# Patient Record
Sex: FEMALE | Race: ASIAN | Employment: OTHER | URBAN - METROPOLITAN AREA
[De-identification: names, ages, dates, MRNs, and addresses within clinical notes are randomized per-mention and may not be internally consistent; named-entity substitution may affect disease eponyms.]

---

## 2023-03-23 ENCOUNTER — HOSPITAL ENCOUNTER (OUTPATIENT)
Age: 68
Discharge: HOME | End: 2023-03-23

## 2023-03-23 LAB
EKG ATRIAL RATE: 81 BPM
EKG P AXIS: 58 DEGREES
EKG P-R INTERVAL: 174 MS
EKG Q-T INTERVAL: 420 MS
EKG QRS DURATION: 100 MS
EKG QTC CALCULATION (BAZETT): 487 MS
EKG R AXIS: 51 DEGREES
EKG T AXIS: -171 DEGREES
EKG VENTRICULAR RATE: 81 BPM

## 2023-03-23 PROCEDURE — 93005 ELECTROCARDIOGRAM TRACING: CPT | Performed by: NURSE PRACTITIONER

## 2023-03-24 ENCOUNTER — APPOINTMENT (OUTPATIENT)
Dept: GENERAL RADIOLOGY | Age: 68
End: 2023-03-24
Payer: MEDICAID

## 2023-03-24 ENCOUNTER — HOSPITAL ENCOUNTER (INPATIENT)
Age: 68
LOS: 15 days | Discharge: HOME | End: 2023-04-08
Attending: EMERGENCY MEDICINE | Admitting: THORACIC SURGERY (CARDIOTHORACIC VASCULAR SURGERY)
Payer: MEDICAID

## 2023-03-24 DIAGNOSIS — I35.0 AORTIC STENOSIS, SEVERE: ICD-10-CM

## 2023-03-24 DIAGNOSIS — Z95.2 S/P AVR: ICD-10-CM

## 2023-03-24 DIAGNOSIS — I21.4 NSTEMI (NON-ST ELEVATED MYOCARDIAL INFARCTION) (HCC): Primary | ICD-10-CM

## 2023-03-24 LAB
ABSOLUTE EOS #: 0.11 K/UL (ref 0–0.44)
ABSOLUTE IMMATURE GRANULOCYTE: <0.03 K/UL (ref 0–0.3)
ABSOLUTE LYMPH #: 3.02 K/UL (ref 1.1–3.7)
ABSOLUTE MONO #: 0.48 K/UL (ref 0.1–1.2)
ANION GAP SERPL CALCULATED.3IONS-SCNC: 10 MMOL/L (ref 9–17)
BASOPHILS # BLD: 0 % (ref 0–2)
BASOPHILS ABSOLUTE: <0.03 K/UL (ref 0–0.2)
BNP SERPL-MCNC: 3234 PG/ML
BUN SERPL-MCNC: 29 MG/DL (ref 8–23)
CALCIUM SERPL-MCNC: 9.2 MG/DL (ref 8.6–10.4)
CHLORIDE SERPL-SCNC: 104 MMOL/L (ref 98–107)
CO2 SERPL-SCNC: 23 MMOL/L (ref 20–31)
CREAT SERPL-MCNC: 0.93 MG/DL (ref 0.5–0.9)
EOSINOPHILS RELATIVE PERCENT: 2 % (ref 1–4)
GFR SERPL CREATININE-BSD FRML MDRD: >60 ML/MIN/1.73M2
GLUCOSE SERPL-MCNC: 103 MG/DL (ref 70–99)
HCT VFR BLD AUTO: 42.6 % (ref 36.3–47.1)
HGB BLD-MCNC: 13.5 G/DL (ref 11.9–15.1)
IMMATURE GRANULOCYTES: 0 %
LYMPHOCYTES # BLD: 55 % (ref 24–43)
MCH RBC QN AUTO: 29.1 PG (ref 25.2–33.5)
MCHC RBC AUTO-ENTMCNC: 31.7 G/DL (ref 28.4–34.8)
MCV RBC AUTO: 91.8 FL (ref 82.6–102.9)
MONOCYTES # BLD: 9 % (ref 3–12)
NRBC AUTOMATED: 0 PER 100 WBC
PARTIAL THROMBOPLASTIN TIME: 32.5 SEC (ref 23–36.5)
PDW BLD-RTO: 13.8 % (ref 11.8–14.4)
PLATELET # BLD AUTO: 197 K/UL (ref 138–453)
PMV BLD AUTO: 10.1 FL (ref 8.1–13.5)
POTASSIUM SERPL-SCNC: 4.2 MMOL/L (ref 3.7–5.3)
RBC # BLD: 4.64 M/UL (ref 3.95–5.11)
SEG NEUTROPHILS: 34 % (ref 36–65)
SEGMENTED NEUTROPHILS ABSOLUTE COUNT: 1.91 K/UL (ref 1.5–8.1)
SODIUM SERPL-SCNC: 137 MMOL/L (ref 135–144)
TROPONIN I SERPL DL<=0.01 NG/ML-MCNC: 20 NG/L (ref 0–14)
TROPONIN I SERPL DL<=0.01 NG/ML-MCNC: 22 NG/L (ref 0–14)
TROPONIN I SERPL DL<=0.01 NG/ML-MCNC: 23 NG/L (ref 0–14)
WBC # BLD AUTO: 5.5 K/UL (ref 3.5–11.3)

## 2023-03-24 PROCEDURE — 36415 COLL VENOUS BLD VENIPUNCTURE: CPT

## 2023-03-24 PROCEDURE — 99285 EMERGENCY DEPT VISIT HI MDM: CPT

## 2023-03-24 PROCEDURE — 6360000002 HC RX W HCPCS

## 2023-03-24 PROCEDURE — 83880 ASSAY OF NATRIURETIC PEPTIDE: CPT

## 2023-03-24 PROCEDURE — 80048 BASIC METABOLIC PNL TOTAL CA: CPT

## 2023-03-24 PROCEDURE — 71046 X-RAY EXAM CHEST 2 VIEWS: CPT

## 2023-03-24 PROCEDURE — 93005 ELECTROCARDIOGRAM TRACING: CPT

## 2023-03-24 PROCEDURE — 2060000000 HC ICU INTERMEDIATE R&B

## 2023-03-24 PROCEDURE — 6370000000 HC RX 637 (ALT 250 FOR IP)

## 2023-03-24 PROCEDURE — 85025 COMPLETE CBC W/AUTO DIFF WBC: CPT

## 2023-03-24 PROCEDURE — 85730 THROMBOPLASTIN TIME PARTIAL: CPT

## 2023-03-24 PROCEDURE — 84484 ASSAY OF TROPONIN QUANT: CPT

## 2023-03-24 RX ORDER — POLYETHYLENE GLYCOL 3350 17 G/17G
17 POWDER, FOR SOLUTION ORAL DAILY PRN
Status: DISCONTINUED | OUTPATIENT
Start: 2023-03-24 | End: 2023-04-04

## 2023-03-24 RX ORDER — ONDANSETRON 4 MG/1
4 TABLET, ORALLY DISINTEGRATING ORAL EVERY 8 HOURS PRN
Status: DISCONTINUED | OUTPATIENT
Start: 2023-03-24 | End: 2023-04-04

## 2023-03-24 RX ORDER — HEPARIN SODIUM 1000 [USP'U]/ML
30 INJECTION, SOLUTION INTRAVENOUS; SUBCUTANEOUS PRN
Status: DISCONTINUED | OUTPATIENT
Start: 2023-03-24 | End: 2023-03-28

## 2023-03-24 RX ORDER — ATORVASTATIN CALCIUM 40 MG/1
40 TABLET, FILM COATED ORAL DAILY
Status: DISCONTINUED | OUTPATIENT
Start: 2023-03-25 | End: 2023-04-04

## 2023-03-24 RX ORDER — SPIRONOLACTONE 25 MG/1
12.5 TABLET ORAL DAILY
COMMUNITY

## 2023-03-24 RX ORDER — HEPARIN SODIUM 1000 [USP'U]/ML
60 INJECTION, SOLUTION INTRAVENOUS; SUBCUTANEOUS ONCE
Status: COMPLETED | OUTPATIENT
Start: 2023-03-24 | End: 2023-03-24

## 2023-03-24 RX ORDER — ASPIRIN 81 MG/1
324 TABLET, CHEWABLE ORAL ONCE
Status: COMPLETED | OUTPATIENT
Start: 2023-03-24 | End: 2023-03-24

## 2023-03-24 RX ORDER — ASPIRIN 81 MG/1
81 TABLET, CHEWABLE ORAL DAILY
COMMUNITY

## 2023-03-24 RX ORDER — HEPARIN SODIUM AND DEXTROSE 10000; 5 [USP'U]/100ML; G/100ML
5-30 INJECTION INTRAVENOUS CONTINUOUS
Status: DISCONTINUED | OUTPATIENT
Start: 2023-03-24 | End: 2023-03-28

## 2023-03-24 RX ORDER — FUROSEMIDE 10 MG/ML
20 INJECTION INTRAMUSCULAR; INTRAVENOUS ONCE
Status: COMPLETED | OUTPATIENT
Start: 2023-03-25 | End: 2023-03-25

## 2023-03-24 RX ORDER — SODIUM CHLORIDE 0.9 % (FLUSH) 0.9 %
5-40 SYRINGE (ML) INJECTION PRN
Status: DISCONTINUED | OUTPATIENT
Start: 2023-03-24 | End: 2023-03-28

## 2023-03-24 RX ORDER — SODIUM CHLORIDE 0.9 % (FLUSH) 0.9 %
5-40 SYRINGE (ML) INJECTION EVERY 12 HOURS SCHEDULED
Status: DISCONTINUED | OUTPATIENT
Start: 2023-03-24 | End: 2023-03-28

## 2023-03-24 RX ORDER — ASPIRIN 81 MG/1
81 TABLET, CHEWABLE ORAL DAILY
Status: DISCONTINUED | OUTPATIENT
Start: 2023-03-25 | End: 2023-04-04

## 2023-03-24 RX ORDER — FUROSEMIDE 40 MG/1
20 TABLET ORAL DAILY
COMMUNITY

## 2023-03-24 RX ORDER — HEPARIN SODIUM 1000 [USP'U]/ML
60 INJECTION, SOLUTION INTRAVENOUS; SUBCUTANEOUS PRN
Status: DISCONTINUED | OUTPATIENT
Start: 2023-03-24 | End: 2023-03-28

## 2023-03-24 RX ORDER — ACETAMINOPHEN 650 MG/1
650 SUPPOSITORY RECTAL EVERY 6 HOURS PRN
Status: DISCONTINUED | OUTPATIENT
Start: 2023-03-24 | End: 2023-04-04

## 2023-03-24 RX ORDER — ACETAMINOPHEN 325 MG/1
650 TABLET ORAL EVERY 6 HOURS PRN
Status: DISCONTINUED | OUTPATIENT
Start: 2023-03-24 | End: 2023-04-04

## 2023-03-24 RX ORDER — SODIUM CHLORIDE 9 MG/ML
INJECTION, SOLUTION INTRAVENOUS PRN
Status: DISCONTINUED | OUTPATIENT
Start: 2023-03-24 | End: 2023-03-28

## 2023-03-24 RX ORDER — LOSARTAN POTASSIUM 50 MG/1
50 TABLET ORAL DAILY
Status: ON HOLD | COMMUNITY
End: 2023-04-08 | Stop reason: HOSPADM

## 2023-03-24 RX ORDER — ONDANSETRON 2 MG/ML
4 INJECTION INTRAMUSCULAR; INTRAVENOUS EVERY 6 HOURS PRN
Status: DISCONTINUED | OUTPATIENT
Start: 2023-03-24 | End: 2023-04-04

## 2023-03-24 RX ADMIN — HEPARIN SODIUM 3860 UNITS: 1000 INJECTION INTRAVENOUS; SUBCUTANEOUS at 18:00

## 2023-03-24 RX ADMIN — ASPIRIN 81 MG 243 MG: 81 TABLET ORAL at 15:05

## 2023-03-24 RX ADMIN — HEPARIN SODIUM AND DEXTROSE 12 UNITS/KG/HR: 10000; 5 INJECTION INTRAVENOUS at 18:06

## 2023-03-24 ASSESSMENT — ENCOUNTER SYMPTOMS
ANAL BLEEDING: 0
EYE DISCHARGE: 0
ABDOMINAL PAIN: 0
CHEST TIGHTNESS: 1
BACK PAIN: 0
COUGH: 0
ABDOMINAL DISTENTION: 0
EYE ITCHING: 0
CHOKING: 0
SHORTNESS OF BREATH: 1
APNEA: 0

## 2023-03-24 ASSESSMENT — PAIN SCALES - GENERAL: PAINLEVEL_OUTOF10: 0

## 2023-03-24 NOTE — ED NOTES
Pt. Arrives to ED via private auto from home or c/o abnormal lab. Pt. Went to PCPs office to establish care and had routine blood work done and was found to have an increased troponin. Pt. Denies CP. Pt. Was recently started on aspirin low dose daily x2 days.        Lexa Guerrero RN  03/24/23 5042

## 2023-03-24 NOTE — ED NOTES
Internal Medicine at bedside      [facility], [facility]  03/24/23 0737

## 2023-03-25 LAB
ABSOLUTE EOS #: 0.09 K/UL (ref 0–0.44)
ABSOLUTE IMMATURE GRANULOCYTE: <0.03 K/UL (ref 0–0.3)
ABSOLUTE LYMPH #: 2.58 K/UL (ref 1.1–3.7)
ABSOLUTE MONO #: 0.31 K/UL (ref 0.1–1.2)
ANION GAP SERPL CALCULATED.3IONS-SCNC: 11 MMOL/L (ref 9–17)
BASOPHILS # BLD: 0 % (ref 0–2)
BASOPHILS ABSOLUTE: <0.03 K/UL (ref 0–0.2)
BUN SERPL-MCNC: 24 MG/DL (ref 8–23)
CALCIUM SERPL-MCNC: 8.9 MG/DL (ref 8.6–10.4)
CHLORIDE SERPL-SCNC: 104 MMOL/L (ref 98–107)
CO2 SERPL-SCNC: 19 MMOL/L (ref 20–31)
CREAT SERPL-MCNC: 0.7 MG/DL (ref 0.5–0.9)
EKG ATRIAL RATE: 89 BPM
EKG P AXIS: 51 DEGREES
EKG P-R INTERVAL: 190 MS
EKG Q-T INTERVAL: 380 MS
EKG QRS DURATION: 102 MS
EKG QTC CALCULATION (BAZETT): 462 MS
EKG R AXIS: 9 DEGREES
EKG T AXIS: -172 DEGREES
EKG VENTRICULAR RATE: 89 BPM
EOSINOPHILS RELATIVE PERCENT: 2 % (ref 1–4)
GFR SERPL CREATININE-BSD FRML MDRD: >60 ML/MIN/1.73M2
GLUCOSE SERPL-MCNC: 90 MG/DL (ref 70–99)
HCT VFR BLD AUTO: 43 % (ref 36.3–47.1)
HGB BLD-MCNC: 12.7 G/DL (ref 11.9–15.1)
IMMATURE GRANULOCYTES: 0 %
LYMPHOCYTES # BLD: 56 % (ref 24–43)
MCH RBC QN AUTO: 29 PG (ref 25.2–33.5)
MCHC RBC AUTO-ENTMCNC: 29.5 G/DL (ref 28.4–34.8)
MCV RBC AUTO: 98.2 FL (ref 82.6–102.9)
MONOCYTES # BLD: 7 % (ref 3–12)
NRBC AUTOMATED: 0 PER 100 WBC
PARTIAL THROMBOPLASTIN TIME: 172.7 SEC (ref 23–36.5)
PARTIAL THROMBOPLASTIN TIME: 51.5 SEC (ref 23–36.5)
PARTIAL THROMBOPLASTIN TIME: 67.7 SEC (ref 23–36.5)
PDW BLD-RTO: 13.7 % (ref 11.8–14.4)
PLATELET # BLD AUTO: 186 K/UL (ref 138–453)
PMV BLD AUTO: 10.7 FL (ref 8.1–13.5)
POTASSIUM SERPL-SCNC: 3.8 MMOL/L (ref 3.7–5.3)
RBC # BLD: 4.38 M/UL (ref 3.95–5.11)
SEG NEUTROPHILS: 35 % (ref 36–65)
SEGMENTED NEUTROPHILS ABSOLUTE COUNT: 1.63 K/UL (ref 1.5–8.1)
SODIUM SERPL-SCNC: 134 MMOL/L (ref 135–144)
WBC # BLD AUTO: 4.6 K/UL (ref 3.5–11.3)

## 2023-03-25 PROCEDURE — 80048 BASIC METABOLIC PNL TOTAL CA: CPT

## 2023-03-25 PROCEDURE — 6360000002 HC RX W HCPCS

## 2023-03-25 PROCEDURE — 6370000000 HC RX 637 (ALT 250 FOR IP)

## 2023-03-25 PROCEDURE — 99222 1ST HOSP IP/OBS MODERATE 55: CPT | Performed by: INTERNAL MEDICINE

## 2023-03-25 PROCEDURE — 2060000000 HC ICU INTERMEDIATE R&B

## 2023-03-25 PROCEDURE — 85730 THROMBOPLASTIN TIME PARTIAL: CPT

## 2023-03-25 PROCEDURE — 93010 ELECTROCARDIOGRAM REPORT: CPT | Performed by: INTERNAL MEDICINE

## 2023-03-25 PROCEDURE — 85025 COMPLETE CBC W/AUTO DIFF WBC: CPT

## 2023-03-25 PROCEDURE — 36415 COLL VENOUS BLD VENIPUNCTURE: CPT

## 2023-03-25 PROCEDURE — 6360000002 HC RX W HCPCS: Performed by: STUDENT IN AN ORGANIZED HEALTH CARE EDUCATION/TRAINING PROGRAM

## 2023-03-25 RX ORDER — FUROSEMIDE 10 MG/ML
20 INJECTION INTRAMUSCULAR; INTRAVENOUS 2 TIMES DAILY
Status: DISCONTINUED | OUTPATIENT
Start: 2023-03-25 | End: 2023-03-29

## 2023-03-25 RX ADMIN — FUROSEMIDE 20 MG: 10 INJECTION, SOLUTION INTRAMUSCULAR; INTRAVENOUS at 18:26

## 2023-03-25 RX ADMIN — FUROSEMIDE 20 MG: 10 INJECTION, SOLUTION INTRAMUSCULAR; INTRAVENOUS at 08:44

## 2023-03-25 RX ADMIN — ASPIRIN 81 MG 81 MG: 81 TABLET ORAL at 08:39

## 2023-03-25 RX ADMIN — DESMOPRESSIN ACETATE 40 MG: 0.2 TABLET ORAL at 08:39

## 2023-03-25 ASSESSMENT — PAIN SCALES - GENERAL
PAINLEVEL_OUTOF10: 0

## 2023-03-25 NOTE — ED PROVIDER NOTES
[address] [facility]     Emergency Department     Faculty Attestation    I performed a history and physical examination of the patient and discussed management with the resident. I reviewed the residents note and agree with the documented findings and plan of care. Any areas of disagreement are noted on the chart. I was personally present for the key portions of any procedures. I have documented in the chart those procedures where I was not present during the key portions. I have reviewed the emergency nurses triage note. I agree with the chief complaint, past medical history, past surgical history, allergies, medications, social and family history as documented unless otherwise noted below. For Physician Assistant/ Nurse Practitioner cases/documentation I have personally evaluated this patient and have completed at least one if not all key elements of the E/M (history, physical exam, and MDM). Additional findings are as noted. I have personally seen and evaluated the patient. I find the patient's history and physical exam are consistent with the NP/PA documentation. I agree with the care provided, treatment rendered, disposition and follow-up plan. Through an  has been explained the patient will require admission for outpatient evaluation which included abnormal EKG and elevated troponins. EKG Interpretation    Interpreted by emergency department physician    Rhythm: normal sinus   Rate: normal  Axis: normal  Conduction: normal  ST Segments: no acute change  T Waves: no acute change  Q Waves: no acute change    Clinical Impression:  nonspecific EKG. paired to EKG from 1 day ago. Critical Care     Sherry BRIEN Bach.   Attending Emergency  Physician            Daryle Gaskin, MD  03/24/23 2251
normal limits   BRAIN NATRIURETIC PEPTIDE - Abnormal; Notable for the following components:    Pro-BNP 3,234 (*)     All other components within normal limits   TROPONIN           PLAN/ TASKS OUTSTANDING     This patient is a 78 y.o. Female. Patient is [place] speaking only, interview by the resident and previous attending were conducted by , patient has an NSTEMI, troponin was sent yesterday from outside facility, she was notified today of her elevated troponin level and came in. Troponin yesterday was 200, no chest pain reported at time of signout. Troponin 22, BNP 3200, plan for aspirin, heparin, chest x-ray has not been completed, we will expedite this, also will evaluate for volume overload and may need extra Lasix. She is on Lasix at home.   This patient will be admitted    (Please note that portions of this note were completed with a voice recognition program.  Efforts were made to edit the dictations but occasionally words are mis-transcribed.)    Pamela Doty MD,, MD  Attending Emergency Physician        Pamela Doty MD  03/24/23 9337
fellow who agreed with plan to start heparin and less likely a STEMI at this time. Troponin in ED was 22, 20. BNP also significantly elevated but no evidence of fluid overload, no shortness of breath, no supplemental O2 requirement. Patient started on heparin drip and admitted to internal medicine. ED Course as of 03/24/23 2020   Fri Mar 24, 2023   1608 Troponin, High Sensitivity(!): 22 [AR]   1608 Pro-BNP(!): 3,234 [AR]      ED Course User Index  [AR] Queen Soren MD       PROCEDURES:  None    CONSULTS:  IP CONSULT TO CARDIOLOGY  IP CONSULT TO INTERNAL MEDICINE  IP CONSULT TO CASE MANAGEMENT        FINAL IMPRESSION      1. NSTEMI (non-ST elevated myocardial infarction) ([facility] [address]          DISPOSITION / PLAN     DISPOSITION Admitted 03/24/2023 05:13:56 PM      PATIENT REFERRED TO:  No follow-up provider specified.     DISCHARGE MEDICATIONS:  Current Discharge Medication List          Luanne Bunn MD  Emergency Medicine Resident    (Please note that portions of thisnote were completed with a voice recognition program.  Efforts were made to edit the dictations but occasionally words are mis-transcribed.)        Queen Soren MD  Resident  03/24/23 2024

## 2023-03-25 NOTE — H&P
Cardiology on board  - proBNP elevated on admission, 3234  - Cardiology on board, awaiting recommendations      3. Stable angina:  - Complains of 7-month history of exertional shortness of breath, inability to lie flat  - Can walk only 15 to 21 m, meeting the criteria for NYHA class II  With a CCS goal of class III  - Takes losartan, spironolactone, Lasix as home medication  - Will resume home medication after cardio recommendation            DVT ppx: On heparin drip  GI ppx: Currently GI prophylaxis    PT/OT/SW: Consulted  Discharge Planning:  consulted    Mike Hong MD  Internal Medicine Resident, PGY-1  [facility];  [place], [place]  3/24/2023, 9:51 PM

## 2023-03-26 LAB
ABSOLUTE EOS #: 0.16 K/UL (ref 0–0.44)
ABSOLUTE IMMATURE GRANULOCYTE: <0.03 K/UL (ref 0–0.3)
ABSOLUTE LYMPH #: 2.35 K/UL (ref 1.1–3.7)
ABSOLUTE MONO #: 0.4 K/UL (ref 0.1–1.2)
ANION GAP SERPL CALCULATED.3IONS-SCNC: 13 MMOL/L (ref 9–17)
BASOPHILS # BLD: 1 % (ref 0–2)
BASOPHILS ABSOLUTE: 0.03 K/UL (ref 0–0.2)
BUN SERPL-MCNC: 26 MG/DL (ref 8–23)
CALCIUM SERPL-MCNC: 9.2 MG/DL (ref 8.6–10.4)
CHLORIDE SERPL-SCNC: 100 MMOL/L (ref 98–107)
CO2 SERPL-SCNC: 21 MMOL/L (ref 20–31)
CREAT SERPL-MCNC: 0.97 MG/DL (ref 0.5–0.9)
EOSINOPHILS RELATIVE PERCENT: 3 % (ref 1–4)
GFR SERPL CREATININE-BSD FRML MDRD: >60 ML/MIN/1.73M2
GLUCOSE SERPL-MCNC: 95 MG/DL (ref 70–99)
HCT VFR BLD AUTO: 43.2 % (ref 36.3–47.1)
HGB BLD-MCNC: 14 G/DL (ref 11.9–15.1)
IMMATURE GRANULOCYTES: 0 %
LYMPHOCYTES # BLD: 48 % (ref 24–43)
MCH RBC QN AUTO: 29.4 PG (ref 25.2–33.5)
MCHC RBC AUTO-ENTMCNC: 32.4 G/DL (ref 28.4–34.8)
MCV RBC AUTO: 90.8 FL (ref 82.6–102.9)
MONOCYTES # BLD: 8 % (ref 3–12)
NRBC AUTOMATED: 0 PER 100 WBC
PARTIAL THROMBOPLASTIN TIME: 54.1 SEC (ref 23–36.5)
PDW BLD-RTO: 13.8 % (ref 11.8–14.4)
PLATELET # BLD AUTO: 189 K/UL (ref 138–453)
PMV BLD AUTO: 10.8 FL (ref 8.1–13.5)
POTASSIUM SERPL-SCNC: 3.6 MMOL/L (ref 3.7–5.3)
RBC # BLD: 4.76 M/UL (ref 3.95–5.11)
SEG NEUTROPHILS: 39 % (ref 36–65)
SEGMENTED NEUTROPHILS ABSOLUTE COUNT: 1.91 K/UL (ref 1.5–8.1)
SODIUM SERPL-SCNC: 134 MMOL/L (ref 135–144)
WBC # BLD AUTO: 4.9 K/UL (ref 3.5–11.3)

## 2023-03-26 PROCEDURE — 6360000002 HC RX W HCPCS

## 2023-03-26 PROCEDURE — 85025 COMPLETE CBC W/AUTO DIFF WBC: CPT

## 2023-03-26 PROCEDURE — 2060000000 HC ICU INTERMEDIATE R&B

## 2023-03-26 PROCEDURE — 6370000000 HC RX 637 (ALT 250 FOR IP): Performed by: NURSE PRACTITIONER

## 2023-03-26 PROCEDURE — 99232 SBSQ HOSP IP/OBS MODERATE 35: CPT | Performed by: INTERNAL MEDICINE

## 2023-03-26 PROCEDURE — 6370000000 HC RX 637 (ALT 250 FOR IP)

## 2023-03-26 PROCEDURE — 85730 THROMBOPLASTIN TIME PARTIAL: CPT

## 2023-03-26 PROCEDURE — 80048 BASIC METABOLIC PNL TOTAL CA: CPT

## 2023-03-26 PROCEDURE — 6360000002 HC RX W HCPCS: Performed by: STUDENT IN AN ORGANIZED HEALTH CARE EDUCATION/TRAINING PROGRAM

## 2023-03-26 PROCEDURE — 36415 COLL VENOUS BLD VENIPUNCTURE: CPT

## 2023-03-26 RX ORDER — POTASSIUM CHLORIDE 20 MEQ/1
20 TABLET, EXTENDED RELEASE ORAL 2 TIMES DAILY WITH MEALS
Status: DISCONTINUED | OUTPATIENT
Start: 2023-03-26 | End: 2023-03-28

## 2023-03-26 RX ADMIN — HEPARIN SODIUM AND DEXTROSE 8 UNITS/KG/HR: 10000; 5 INJECTION INTRAVENOUS at 13:33

## 2023-03-26 RX ADMIN — DESMOPRESSIN ACETATE 40 MG: 0.2 TABLET ORAL at 08:37

## 2023-03-26 RX ADMIN — POTASSIUM CHLORIDE 20 MEQ: 1500 TABLET, EXTENDED RELEASE ORAL at 12:14

## 2023-03-26 RX ADMIN — POTASSIUM CHLORIDE 20 MEQ: 1500 TABLET, EXTENDED RELEASE ORAL at 17:27

## 2023-03-26 RX ADMIN — FUROSEMIDE 20 MG: 10 INJECTION, SOLUTION INTRAMUSCULAR; INTRAVENOUS at 08:37

## 2023-03-26 RX ADMIN — ASPIRIN 81 MG 81 MG: 81 TABLET ORAL at 08:37

## 2023-03-26 RX ADMIN — FUROSEMIDE 20 MG: 10 INJECTION, SOLUTION INTRAMUSCULAR; INTRAVENOUS at 17:27

## 2023-03-26 ASSESSMENT — PAIN SCALES - GENERAL
PAINLEVEL_OUTOF10: 0

## 2023-03-27 LAB
ABSOLUTE EOS #: 0.27 K/UL (ref 0–0.44)
ABSOLUTE IMMATURE GRANULOCYTE: <0.03 K/UL (ref 0–0.3)
ABSOLUTE LYMPH #: 2.49 K/UL (ref 1.1–3.7)
ABSOLUTE MONO #: 0.44 K/UL (ref 0.1–1.2)
ANION GAP SERPL CALCULATED.3IONS-SCNC: 14 MMOL/L (ref 9–17)
BASOPHILS # BLD: 1 % (ref 0–2)
BASOPHILS ABSOLUTE: 0.05 K/UL (ref 0–0.2)
BUN SERPL-MCNC: 28 MG/DL (ref 8–23)
CALCIUM SERPL-MCNC: 9.7 MG/DL (ref 8.6–10.4)
CHLORIDE SERPL-SCNC: 103 MMOL/L (ref 98–107)
CO2 SERPL-SCNC: 21 MMOL/L (ref 20–31)
CREAT SERPL-MCNC: 1.05 MG/DL (ref 0.5–0.9)
EOSINOPHILS RELATIVE PERCENT: 6 % (ref 1–4)
GFR SERPL CREATININE-BSD FRML MDRD: 59 ML/MIN/1.73M2
GLUCOSE SERPL-MCNC: 105 MG/DL (ref 70–99)
HCT VFR BLD AUTO: 44.3 % (ref 36.3–47.1)
HGB BLD-MCNC: 14.1 G/DL (ref 11.9–15.1)
IMMATURE GRANULOCYTES: 0 %
LYMPHOCYTES # BLD: 56 % (ref 24–43)
MCH RBC QN AUTO: 29.3 PG (ref 25.2–33.5)
MCHC RBC AUTO-ENTMCNC: 31.8 G/DL (ref 28.4–34.8)
MCV RBC AUTO: 92.1 FL (ref 82.6–102.9)
MONOCYTES # BLD: 10 % (ref 3–12)
NRBC AUTOMATED: 0 PER 100 WBC
PARTIAL THROMBOPLASTIN TIME: 53 SEC (ref 23–36.5)
PDW BLD-RTO: 13.6 % (ref 11.8–14.4)
PLATELET # BLD AUTO: 195 K/UL (ref 138–453)
PMV BLD AUTO: 10.7 FL (ref 8.1–13.5)
POTASSIUM SERPL-SCNC: 4.2 MMOL/L (ref 3.7–5.3)
RBC # BLD: 4.81 M/UL (ref 3.95–5.11)
SEG NEUTROPHILS: 27 % (ref 36–65)
SEGMENTED NEUTROPHILS ABSOLUTE COUNT: 1.23 K/UL (ref 1.5–8.1)
SODIUM SERPL-SCNC: 138 MMOL/L (ref 135–144)
WBC # BLD AUTO: 4.5 K/UL (ref 3.5–11.3)

## 2023-03-27 PROCEDURE — 6370000000 HC RX 637 (ALT 250 FOR IP): Performed by: NURSE PRACTITIONER

## 2023-03-27 PROCEDURE — 80048 BASIC METABOLIC PNL TOTAL CA: CPT

## 2023-03-27 PROCEDURE — 6370000000 HC RX 637 (ALT 250 FOR IP)

## 2023-03-27 PROCEDURE — 85025 COMPLETE CBC W/AUTO DIFF WBC: CPT

## 2023-03-27 PROCEDURE — 85730 THROMBOPLASTIN TIME PARTIAL: CPT

## 2023-03-27 PROCEDURE — 36415 COLL VENOUS BLD VENIPUNCTURE: CPT

## 2023-03-27 PROCEDURE — 99232 SBSQ HOSP IP/OBS MODERATE 35: CPT | Performed by: INTERNAL MEDICINE

## 2023-03-27 PROCEDURE — 2060000000 HC ICU INTERMEDIATE R&B

## 2023-03-27 PROCEDURE — 6370000000 HC RX 637 (ALT 250 FOR IP): Performed by: SURGERY

## 2023-03-27 RX ORDER — NITROGLYCERIN 0.4 MG/1
0.4 TABLET SUBLINGUAL EVERY 5 MIN PRN
Status: DISCONTINUED | OUTPATIENT
Start: 2023-03-27 | End: 2023-04-04

## 2023-03-27 RX ADMIN — POTASSIUM CHLORIDE 20 MEQ: 1500 TABLET, EXTENDED RELEASE ORAL at 18:23

## 2023-03-27 RX ADMIN — DESMOPRESSIN ACETATE 40 MG: 0.2 TABLET ORAL at 08:40

## 2023-03-27 RX ADMIN — ASPIRIN 81 MG 81 MG: 81 TABLET ORAL at 08:40

## 2023-03-27 RX ADMIN — METOPROLOL TARTRATE 12.5 MG: 25 TABLET, FILM COATED ORAL at 20:07

## 2023-03-27 RX ADMIN — METOPROLOL TARTRATE 12.5 MG: 25 TABLET, FILM COATED ORAL at 12:00

## 2023-03-27 RX ADMIN — POTASSIUM CHLORIDE 20 MEQ: 1500 TABLET, EXTENDED RELEASE ORAL at 08:40

## 2023-03-27 ASSESSMENT — PAIN SCALES - GENERAL: PAINLEVEL_OUTOF10: 0

## 2023-03-28 LAB
ABSOLUTE EOS #: 0.29 K/UL (ref 0–0.44)
ABSOLUTE IMMATURE GRANULOCYTE: <0.03 K/UL (ref 0–0.3)
ABSOLUTE LYMPH #: 3.43 K/UL (ref 1.1–3.7)
ABSOLUTE MONO #: 0.59 K/UL (ref 0.1–1.2)
ANION GAP SERPL CALCULATED.3IONS-SCNC: 9 MMOL/L (ref 9–17)
BASOPHILS # BLD: 1 % (ref 0–2)
BASOPHILS ABSOLUTE: 0.06 K/UL (ref 0–0.2)
BUN SERPL-MCNC: 26 MG/DL (ref 8–23)
CALCIUM SERPL-MCNC: 9.4 MG/DL (ref 8.6–10.4)
CHLORIDE SERPL-SCNC: 103 MMOL/L (ref 98–107)
CO2 SERPL-SCNC: 23 MMOL/L (ref 20–31)
CREAT SERPL-MCNC: 1 MG/DL (ref 0.5–0.9)
EOSINOPHILS RELATIVE PERCENT: 5 % (ref 1–4)
GFR SERPL CREATININE-BSD FRML MDRD: >60 ML/MIN/1.73M2
GLUCOSE SERPL-MCNC: 94 MG/DL (ref 70–99)
HCT VFR BLD AUTO: 48 % (ref 36.3–47.1)
HGB BLD-MCNC: 14.6 G/DL (ref 11.9–15.1)
IMMATURE GRANULOCYTES: 0 %
LV EF: 30 %
LV EF: 33 %
LVEF MODALITY: NORMAL
LVEF MODALITY: NORMAL
LYMPHOCYTES # BLD: 56 % (ref 24–43)
MCH RBC QN AUTO: 29.3 PG (ref 25.2–33.5)
MCHC RBC AUTO-ENTMCNC: 30.4 G/DL (ref 28.4–34.8)
MCV RBC AUTO: 96.4 FL (ref 82.6–102.9)
MONOCYTES # BLD: 10 % (ref 3–12)
NRBC AUTOMATED: 0 PER 100 WBC
PARTIAL THROMBOPLASTIN TIME: 57.2 SEC (ref 23–36.5)
PDW BLD-RTO: 13.7 % (ref 11.8–14.4)
PLATELET # BLD AUTO: 190 K/UL (ref 138–453)
PMV BLD AUTO: 10.7 FL (ref 8.1–13.5)
POTASSIUM SERPL-SCNC: 4.9 MMOL/L (ref 3.7–5.3)
RBC # BLD: 4.98 M/UL (ref 3.95–5.11)
SEG NEUTROPHILS: 28 % (ref 36–65)
SEGMENTED NEUTROPHILS ABSOLUTE COUNT: 1.7 K/UL (ref 1.5–8.1)
SODIUM SERPL-SCNC: 135 MMOL/L (ref 135–144)
WBC # BLD AUTO: 6.1 K/UL (ref 3.5–11.3)

## 2023-03-28 PROCEDURE — 93306 TTE W/DOPPLER COMPLETE: CPT

## 2023-03-28 PROCEDURE — 2580000003 HC RX 258: Performed by: STUDENT IN AN ORGANIZED HEALTH CARE EDUCATION/TRAINING PROGRAM

## 2023-03-28 PROCEDURE — C1874 STENT, COATED/COV W/DEL SYS: HCPCS

## 2023-03-28 PROCEDURE — C1894 INTRO/SHEATH, NON-LASER: HCPCS

## 2023-03-28 PROCEDURE — 93356 MYOCRD STRAIN IMG SPCKL TRCK: CPT

## 2023-03-28 PROCEDURE — 6370000000 HC RX 637 (ALT 250 FOR IP): Performed by: NURSE PRACTITIONER

## 2023-03-28 PROCEDURE — C1760 CLOSURE DEV, VASC: HCPCS

## 2023-03-28 PROCEDURE — 99232 SBSQ HOSP IP/OBS MODERATE 35: CPT | Performed by: INTERNAL MEDICINE

## 2023-03-28 PROCEDURE — 2580000003 HC RX 258

## 2023-03-28 PROCEDURE — 6360000004 HC RX CONTRAST MEDICATION

## 2023-03-28 PROCEDURE — 2709999900 HC NON-CHARGEABLE SUPPLY

## 2023-03-28 PROCEDURE — 6360000002 HC RX W HCPCS: Performed by: STUDENT IN AN ORGANIZED HEALTH CARE EDUCATION/TRAINING PROGRAM

## 2023-03-28 PROCEDURE — 6370000000 HC RX 637 (ALT 250 FOR IP): Performed by: SURGERY

## 2023-03-28 PROCEDURE — 6360000002 HC RX W HCPCS

## 2023-03-28 PROCEDURE — 93460 R&L HRT ART/VENTRICLE ANGIO: CPT

## 2023-03-28 PROCEDURE — 85730 THROMBOPLASTIN TIME PARTIAL: CPT

## 2023-03-28 PROCEDURE — 80048 BASIC METABOLIC PNL TOTAL CA: CPT

## 2023-03-28 PROCEDURE — C1751 CATH, INF, PER/CENT/MIDLINE: HCPCS

## 2023-03-28 PROCEDURE — 6370000000 HC RX 637 (ALT 250 FOR IP)

## 2023-03-28 PROCEDURE — 2060000000 HC ICU INTERMEDIATE R&B

## 2023-03-28 PROCEDURE — 2500000003 HC RX 250 WO HCPCS

## 2023-03-28 PROCEDURE — 85025 COMPLETE CBC W/AUTO DIFF WBC: CPT

## 2023-03-28 PROCEDURE — C1769 GUIDE WIRE: HCPCS

## 2023-03-28 PROCEDURE — 36415 COLL VENOUS BLD VENIPUNCTURE: CPT

## 2023-03-28 PROCEDURE — C1887 CATHETER, GUIDING: HCPCS

## 2023-03-28 RX ORDER — SODIUM CHLORIDE 9 MG/ML
INJECTION, SOLUTION INTRAVENOUS PRN
Status: DISCONTINUED | OUTPATIENT
Start: 2023-03-28 | End: 2023-04-04

## 2023-03-28 RX ORDER — ENOXAPARIN SODIUM 100 MG/ML
40 INJECTION SUBCUTANEOUS DAILY
Status: DISCONTINUED | OUTPATIENT
Start: 2023-03-28 | End: 2023-04-04

## 2023-03-28 RX ORDER — ACETAMINOPHEN 325 MG/1
650 TABLET ORAL EVERY 4 HOURS PRN
Status: DISCONTINUED | OUTPATIENT
Start: 2023-03-28 | End: 2023-03-28

## 2023-03-28 RX ORDER — SODIUM CHLORIDE 0.9 % (FLUSH) 0.9 %
5-40 SYRINGE (ML) INJECTION EVERY 12 HOURS SCHEDULED
Status: DISCONTINUED | OUTPATIENT
Start: 2023-03-28 | End: 2023-04-04

## 2023-03-28 RX ORDER — SODIUM CHLORIDE 0.9 % (FLUSH) 0.9 %
5-40 SYRINGE (ML) INJECTION PRN
Status: DISCONTINUED | OUTPATIENT
Start: 2023-03-28 | End: 2023-04-04

## 2023-03-28 RX ADMIN — FUROSEMIDE 20 MG: 10 INJECTION, SOLUTION INTRAMUSCULAR; INTRAVENOUS at 07:49

## 2023-03-28 RX ADMIN — SODIUM CHLORIDE, PRESERVATIVE FREE 10 ML: 5 INJECTION INTRAVENOUS at 07:49

## 2023-03-28 RX ADMIN — FUROSEMIDE 20 MG: 10 INJECTION, SOLUTION INTRAMUSCULAR; INTRAVENOUS at 17:43

## 2023-03-28 RX ADMIN — METOPROLOL TARTRATE 12.5 MG: 25 TABLET, FILM COATED ORAL at 07:49

## 2023-03-28 RX ADMIN — SODIUM CHLORIDE, PRESERVATIVE FREE 10 ML: 5 INJECTION INTRAVENOUS at 22:38

## 2023-03-28 RX ADMIN — POTASSIUM CHLORIDE 20 MEQ: 1500 TABLET, EXTENDED RELEASE ORAL at 07:51

## 2023-03-28 RX ADMIN — ENOXAPARIN SODIUM 40 MG: 100 INJECTION SUBCUTANEOUS at 22:38

## 2023-03-28 RX ADMIN — ASPIRIN 81 MG 81 MG: 81 TABLET ORAL at 07:49

## 2023-03-28 RX ADMIN — DESMOPRESSIN ACETATE 40 MG: 0.2 TABLET ORAL at 07:49

## 2023-03-28 ASSESSMENT — PAIN SCALES - GENERAL
PAINLEVEL_OUTOF10: 0

## 2023-03-28 NOTE — OP NOTE
T   [] New LBBB           [] T. Inv  []  ST dev > 0.5 mm         [] PVC's freq  [] PVC's infrequent    Stress Test Performed:      [] Yes    [x] No     Type:     [] Stress Echo   [] Exercise Stress Test (no imaging)      [] Stress Nuclear  [] Stress Imaging     Results   [] Negative   [] Positive        [] Indeterminate  [] Unavailable     If Positive/ Risk / Extent of Ischemia:       [] Low  [] Intermediate         [] High  [] Unavailable      Cardiac CTA Performed:     [] Yes    [x] No      Results   [] CAD   [] Non obstructive CAD      [] No CAD   [] Uncertain      [] Unknown   [] Structural Disease. Pre Procedure Medications:   [] Yes    [x] No         [] ASA   [] Beta Blockers      [] Nitrate   [] Ca Channel Blockers      [] Ranolazine   [] Statin       [] Plavix/Others antiplatelets        Stephanie Webster MD.  Fellow, cardiovascular disease   [place], [place]  3/28/2023, 7:46 PM    I was present during entire procedure and performed all critical elements of the procedure.     Daniel Singh MD

## 2023-03-29 ENCOUNTER — APPOINTMENT (OUTPATIENT)
Dept: CT IMAGING | Age: 68
End: 2023-03-29
Payer: MEDICAID

## 2023-03-29 PROBLEM — I35.0 SEVERE AORTIC STENOSIS: Status: ACTIVE | Noted: 2023-03-29

## 2023-03-29 PROBLEM — I35.0 NONRHEUMATIC AORTIC VALVE STENOSIS: Status: ACTIVE | Noted: 2023-03-29

## 2023-03-29 PROBLEM — I50.21 ACUTE SYSTOLIC CHF (CONGESTIVE HEART FAILURE) (HCC): Status: ACTIVE | Noted: 2023-03-29

## 2023-03-29 LAB
ABSOLUTE EOS #: 0.22 K/UL (ref 0–0.44)
ABSOLUTE IMMATURE GRANULOCYTE: 0.04 K/UL (ref 0–0.3)
ABSOLUTE LYMPH #: 2.23 K/UL (ref 1.1–3.7)
ABSOLUTE MONO #: 0.43 K/UL (ref 0.1–1.2)
ANION GAP SERPL CALCULATED.3IONS-SCNC: 11 MMOL/L (ref 9–17)
BASOPHILS # BLD: 1 % (ref 0–2)
BASOPHILS ABSOLUTE: 0.04 K/UL (ref 0–0.2)
BUN SERPL-MCNC: 30 MG/DL (ref 8–23)
CALCIUM SERPL-MCNC: 9.2 MG/DL (ref 8.6–10.4)
CHLORIDE SERPL-SCNC: 104 MMOL/L (ref 98–107)
CO2 SERPL-SCNC: 23 MMOL/L (ref 20–31)
CREAT SERPL-MCNC: 1.03 MG/DL (ref 0.5–0.9)
EOSINOPHILS RELATIVE PERCENT: 4 % (ref 1–4)
GFR SERPL CREATININE-BSD FRML MDRD: 60 ML/MIN/1.73M2
GLUCOSE SERPL-MCNC: 88 MG/DL (ref 70–99)
HCT VFR BLD AUTO: 41.9 % (ref 36.3–47.1)
HGB BLD-MCNC: 13.7 G/DL (ref 11.9–15.1)
IMMATURE GRANULOCYTES: 1 %
LYMPHOCYTES # BLD: 42 % (ref 24–43)
MCH RBC QN AUTO: 29.4 PG (ref 25.2–33.5)
MCHC RBC AUTO-ENTMCNC: 32.7 G/DL (ref 28.4–34.8)
MCV RBC AUTO: 89.9 FL (ref 82.6–102.9)
MONOCYTES # BLD: 8 % (ref 3–12)
NRBC AUTOMATED: 0.6 PER 100 WBC
PARTIAL THROMBOPLASTIN TIME: 40.7 SEC (ref 23–36.5)
PDW BLD-RTO: 13.5 % (ref 11.8–14.4)
PLATELET # BLD AUTO: 176 K/UL (ref 138–453)
PMV BLD AUTO: 11.6 FL (ref 8.1–13.5)
POTASSIUM SERPL-SCNC: 4.2 MMOL/L (ref 3.7–5.3)
RBC # BLD: 4.66 M/UL (ref 3.95–5.11)
SEG NEUTROPHILS: 45 % (ref 36–65)
SEGMENTED NEUTROPHILS ABSOLUTE COUNT: 2.41 K/UL (ref 1.5–8.1)
SODIUM SERPL-SCNC: 138 MMOL/L (ref 135–144)
WBC # BLD AUTO: 5.4 K/UL (ref 3.5–11.3)

## 2023-03-29 PROCEDURE — 36415 COLL VENOUS BLD VENIPUNCTURE: CPT

## 2023-03-29 PROCEDURE — 99254 IP/OBS CNSLTJ NEW/EST MOD 60: CPT | Performed by: NURSE PRACTITIONER

## 2023-03-29 PROCEDURE — 99232 SBSQ HOSP IP/OBS MODERATE 35: CPT | Performed by: INTERNAL MEDICINE

## 2023-03-29 PROCEDURE — 85025 COMPLETE CBC W/AUTO DIFF WBC: CPT

## 2023-03-29 PROCEDURE — 6370000000 HC RX 637 (ALT 250 FOR IP)

## 2023-03-29 PROCEDURE — 71250 CT THORAX DX C-: CPT

## 2023-03-29 PROCEDURE — 80048 BASIC METABOLIC PNL TOTAL CA: CPT

## 2023-03-29 PROCEDURE — 94760 N-INVAS EAR/PLS OXIMETRY 1: CPT

## 2023-03-29 PROCEDURE — 93880 EXTRACRANIAL BILAT STUDY: CPT

## 2023-03-29 PROCEDURE — 6360000002 HC RX W HCPCS: Performed by: STUDENT IN AN ORGANIZED HEALTH CARE EDUCATION/TRAINING PROGRAM

## 2023-03-29 PROCEDURE — 85730 THROMBOPLASTIN TIME PARTIAL: CPT

## 2023-03-29 PROCEDURE — 6370000000 HC RX 637 (ALT 250 FOR IP): Performed by: SURGERY

## 2023-03-29 PROCEDURE — 2060000000 HC ICU INTERMEDIATE R&B

## 2023-03-29 PROCEDURE — 2580000003 HC RX 258: Performed by: STUDENT IN AN ORGANIZED HEALTH CARE EDUCATION/TRAINING PROGRAM

## 2023-03-29 RX ORDER — FUROSEMIDE 10 MG/ML
20 INJECTION INTRAMUSCULAR; INTRAVENOUS DAILY
Status: DISCONTINUED | OUTPATIENT
Start: 2023-03-29 | End: 2023-03-30

## 2023-03-29 RX ADMIN — SODIUM CHLORIDE, PRESERVATIVE FREE 10 ML: 5 INJECTION INTRAVENOUS at 11:07

## 2023-03-29 RX ADMIN — DESMOPRESSIN ACETATE 40 MG: 0.2 TABLET ORAL at 10:57

## 2023-03-29 RX ADMIN — ENOXAPARIN SODIUM 40 MG: 100 INJECTION SUBCUTANEOUS at 09:00

## 2023-03-29 RX ADMIN — SODIUM CHLORIDE, PRESERVATIVE FREE 10 ML: 5 INJECTION INTRAVENOUS at 21:32

## 2023-03-29 RX ADMIN — ACETAMINOPHEN 650 MG: 325 TABLET ORAL at 04:15

## 2023-03-29 RX ADMIN — ASPIRIN 81 MG 81 MG: 81 TABLET ORAL at 10:57

## 2023-03-29 ASSESSMENT — PAIN SCALES - GENERAL
PAINLEVEL_OUTOF10: 0
PAINLEVEL_OUTOF10: 3
PAINLEVEL_OUTOF10: 0

## 2023-03-29 ASSESSMENT — PAIN DESCRIPTION - LOCATION: LOCATION: HEAD

## 2023-03-29 ASSESSMENT — PAIN - FUNCTIONAL ASSESSMENT: PAIN_FUNCTIONAL_ASSESSMENT: ACTIVITIES ARE NOT PREVENTED

## 2023-03-29 ASSESSMENT — PAIN DESCRIPTION - DESCRIPTORS: DESCRIPTORS: ACHING

## 2023-03-30 LAB
ABSOLUTE EOS #: 0.23 K/UL (ref 0–0.44)
ABSOLUTE IMMATURE GRANULOCYTE: <0.03 K/UL (ref 0–0.3)
ABSOLUTE LYMPH #: 2.34 K/UL (ref 1.1–3.7)
ABSOLUTE MONO #: 0.59 K/UL (ref 0.1–1.2)
ANION GAP SERPL CALCULATED.3IONS-SCNC: 10 MMOL/L (ref 9–17)
BASOPHILS # BLD: 1 % (ref 0–2)
BASOPHILS ABSOLUTE: 0.04 K/UL (ref 0–0.2)
BUN SERPL-MCNC: 25 MG/DL (ref 8–23)
CALCIUM SERPL-MCNC: 8.9 MG/DL (ref 8.6–10.4)
CHLORIDE SERPL-SCNC: 105 MMOL/L (ref 98–107)
CO2 SERPL-SCNC: 19 MMOL/L (ref 20–31)
CREAT SERPL-MCNC: 0.85 MG/DL (ref 0.5–0.9)
EOSINOPHILS RELATIVE PERCENT: 3 % (ref 1–4)
GFR SERPL CREATININE-BSD FRML MDRD: >60 ML/MIN/1.73M2
GLUCOSE SERPL-MCNC: 94 MG/DL (ref 70–99)
HCT VFR BLD AUTO: 42.7 % (ref 36.3–47.1)
HGB BLD-MCNC: 13.3 G/DL (ref 11.9–15.1)
IMMATURE GRANULOCYTES: 0 %
LYMPHOCYTES # BLD: 32 % (ref 24–43)
MCH RBC QN AUTO: 29.8 PG (ref 25.2–33.5)
MCHC RBC AUTO-ENTMCNC: 31.1 G/DL (ref 28.4–34.8)
MCV RBC AUTO: 95.5 FL (ref 82.6–102.9)
MONOCYTES # BLD: 8 % (ref 3–12)
NRBC AUTOMATED: 0 PER 100 WBC
PDW BLD-RTO: 13.5 % (ref 11.8–14.4)
PLATELET # BLD AUTO: 190 K/UL (ref 138–453)
PMV BLD AUTO: 11 FL (ref 8.1–13.5)
POTASSIUM SERPL-SCNC: 4.5 MMOL/L (ref 3.7–5.3)
RBC # BLD: 4.47 M/UL (ref 3.95–5.11)
SEG NEUTROPHILS: 56 % (ref 36–65)
SEGMENTED NEUTROPHILS ABSOLUTE COUNT: 4.05 K/UL (ref 1.5–8.1)
SODIUM SERPL-SCNC: 134 MMOL/L (ref 135–144)
WBC # BLD AUTO: 7.3 K/UL (ref 3.5–11.3)

## 2023-03-30 PROCEDURE — 2060000000 HC ICU INTERMEDIATE R&B

## 2023-03-30 PROCEDURE — 6360000002 HC RX W HCPCS: Performed by: STUDENT IN AN ORGANIZED HEALTH CARE EDUCATION/TRAINING PROGRAM

## 2023-03-30 PROCEDURE — 94760 N-INVAS EAR/PLS OXIMETRY 1: CPT

## 2023-03-30 PROCEDURE — 6370000000 HC RX 637 (ALT 250 FOR IP)

## 2023-03-30 PROCEDURE — 36415 COLL VENOUS BLD VENIPUNCTURE: CPT

## 2023-03-30 PROCEDURE — 99232 SBSQ HOSP IP/OBS MODERATE 35: CPT | Performed by: INTERNAL MEDICINE

## 2023-03-30 PROCEDURE — 85025 COMPLETE CBC W/AUTO DIFF WBC: CPT

## 2023-03-30 PROCEDURE — 2580000003 HC RX 258: Performed by: STUDENT IN AN ORGANIZED HEALTH CARE EDUCATION/TRAINING PROGRAM

## 2023-03-30 PROCEDURE — 80048 BASIC METABOLIC PNL TOTAL CA: CPT

## 2023-03-30 RX ORDER — FUROSEMIDE 40 MG/1
20 TABLET ORAL DAILY
Status: DISCONTINUED | OUTPATIENT
Start: 2023-03-31 | End: 2023-04-04

## 2023-03-30 RX ADMIN — DESMOPRESSIN ACETATE 40 MG: 0.2 TABLET ORAL at 09:18

## 2023-03-30 RX ADMIN — ENOXAPARIN SODIUM 40 MG: 100 INJECTION SUBCUTANEOUS at 09:20

## 2023-03-30 RX ADMIN — ACETAMINOPHEN 650 MG: 325 TABLET ORAL at 11:24

## 2023-03-30 RX ADMIN — SODIUM CHLORIDE, PRESERVATIVE FREE 10 ML: 5 INJECTION INTRAVENOUS at 21:58

## 2023-03-30 RX ADMIN — ASPIRIN 81 MG 81 MG: 81 TABLET ORAL at 09:20

## 2023-03-30 RX ADMIN — SODIUM CHLORIDE, PRESERVATIVE FREE 10 ML: 5 INJECTION INTRAVENOUS at 09:21

## 2023-03-30 RX ADMIN — FUROSEMIDE 20 MG: 10 INJECTION, SOLUTION INTRAMUSCULAR; INTRAVENOUS at 09:20

## 2023-03-30 ASSESSMENT — PAIN SCALES - GENERAL
PAINLEVEL_OUTOF10: 0
PAINLEVEL_OUTOF10: 3
PAINLEVEL_OUTOF10: 3
PAINLEVEL_OUTOF10: 0
PAINLEVEL_OUTOF10: 0

## 2023-03-30 ASSESSMENT — PAIN DESCRIPTION - LOCATION: LOCATION: HEAD

## 2023-03-31 LAB
ABSOLUTE EOS #: 0.23 K/UL (ref 0–0.44)
ABSOLUTE IMMATURE GRANULOCYTE: <0.03 K/UL (ref 0–0.3)
ABSOLUTE LYMPH #: 2.45 K/UL (ref 1.1–3.7)
ABSOLUTE MONO #: 0.49 K/UL (ref 0.1–1.2)
ANION GAP SERPL CALCULATED.3IONS-SCNC: 10 MMOL/L (ref 9–17)
BASOPHILS # BLD: 1 % (ref 0–2)
BASOPHILS ABSOLUTE: 0.04 K/UL (ref 0–0.2)
BUN SERPL-MCNC: 19 MG/DL (ref 8–23)
CALCIUM SERPL-MCNC: 8.8 MG/DL (ref 8.6–10.4)
CHLORIDE SERPL-SCNC: 105 MMOL/L (ref 98–107)
CO2 SERPL-SCNC: 20 MMOL/L (ref 20–31)
CREAT SERPL-MCNC: 0.78 MG/DL (ref 0.5–0.9)
EOSINOPHILS RELATIVE PERCENT: 4 % (ref 1–4)
GFR SERPL CREATININE-BSD FRML MDRD: >60 ML/MIN/1.73M2
GLUCOSE SERPL-MCNC: 88 MG/DL (ref 70–99)
HCT VFR BLD AUTO: 40.8 % (ref 36.3–47.1)
HGB BLD-MCNC: 12.8 G/DL (ref 11.9–15.1)
IMMATURE GRANULOCYTES: 0 %
LYMPHOCYTES # BLD: 41 % (ref 24–43)
MCH RBC QN AUTO: 29.3 PG (ref 25.2–33.5)
MCHC RBC AUTO-ENTMCNC: 31.4 G/DL (ref 28.4–34.8)
MCV RBC AUTO: 93.4 FL (ref 82.6–102.9)
MONOCYTES # BLD: 8 % (ref 3–12)
NRBC AUTOMATED: 0.3 PER 100 WBC
PDW BLD-RTO: 13.5 % (ref 11.8–14.4)
PLATELET # BLD AUTO: 155 K/UL (ref 138–453)
PMV BLD AUTO: 10.2 FL (ref 8.1–13.5)
POTASSIUM SERPL-SCNC: 4.2 MMOL/L (ref 3.7–5.3)
RBC # BLD: 4.37 M/UL (ref 3.95–5.11)
SEG NEUTROPHILS: 46 % (ref 36–65)
SEGMENTED NEUTROPHILS ABSOLUTE COUNT: 2.78 K/UL (ref 1.5–8.1)
SODIUM SERPL-SCNC: 135 MMOL/L (ref 135–144)
WBC # BLD AUTO: 6 K/UL (ref 3.5–11.3)

## 2023-03-31 PROCEDURE — 2580000003 HC RX 258: Performed by: STUDENT IN AN ORGANIZED HEALTH CARE EDUCATION/TRAINING PROGRAM

## 2023-03-31 PROCEDURE — 6360000002 HC RX W HCPCS: Performed by: STUDENT IN AN ORGANIZED HEALTH CARE EDUCATION/TRAINING PROGRAM

## 2023-03-31 PROCEDURE — 6370000000 HC RX 637 (ALT 250 FOR IP): Performed by: SURGERY

## 2023-03-31 PROCEDURE — 2060000000 HC ICU INTERMEDIATE R&B

## 2023-03-31 PROCEDURE — 85025 COMPLETE CBC W/AUTO DIFF WBC: CPT

## 2023-03-31 PROCEDURE — 6370000000 HC RX 637 (ALT 250 FOR IP)

## 2023-03-31 PROCEDURE — 80048 BASIC METABOLIC PNL TOTAL CA: CPT

## 2023-03-31 PROCEDURE — 36415 COLL VENOUS BLD VENIPUNCTURE: CPT

## 2023-03-31 PROCEDURE — 99231 SBSQ HOSP IP/OBS SF/LOW 25: CPT | Performed by: INTERNAL MEDICINE

## 2023-03-31 RX ADMIN — ASPIRIN 81 MG 81 MG: 81 TABLET ORAL at 08:10

## 2023-03-31 RX ADMIN — DESMOPRESSIN ACETATE 40 MG: 0.2 TABLET ORAL at 08:10

## 2023-03-31 RX ADMIN — ENOXAPARIN SODIUM 40 MG: 100 INJECTION SUBCUTANEOUS at 08:10

## 2023-03-31 RX ADMIN — NITROGLYCERIN 0.4 MG: 0.4 TABLET SUBLINGUAL at 21:01

## 2023-03-31 RX ADMIN — FUROSEMIDE 20 MG: 20 TABLET ORAL at 08:10

## 2023-03-31 RX ADMIN — SODIUM CHLORIDE, PRESERVATIVE FREE 10 ML: 5 INJECTION INTRAVENOUS at 08:10

## 2023-03-31 RX ADMIN — SODIUM CHLORIDE, PRESERVATIVE FREE 5 ML: 5 INJECTION INTRAVENOUS at 21:07

## 2023-03-31 ASSESSMENT — PAIN SCALES - GENERAL: PAINLEVEL_OUTOF10: 3

## 2023-03-31 ASSESSMENT — PAIN DESCRIPTION - DESCRIPTORS: DESCRIPTORS: ACHING

## 2023-03-31 ASSESSMENT — PAIN DESCRIPTION - LOCATION: LOCATION: CHEST

## 2023-04-01 LAB
ANION GAP SERPL CALCULATED.3IONS-SCNC: 12 MMOL/L (ref 9–17)
BUN SERPL-MCNC: 16 MG/DL (ref 8–23)
CALCIUM SERPL-MCNC: 9.2 MG/DL (ref 8.6–10.4)
CHLORIDE SERPL-SCNC: 102 MMOL/L (ref 98–107)
CO2 SERPL-SCNC: 22 MMOL/L (ref 20–31)
CREAT SERPL-MCNC: 0.91 MG/DL (ref 0.5–0.9)
GFR SERPL CREATININE-BSD FRML MDRD: >60 ML/MIN/1.73M2
GLUCOSE SERPL-MCNC: 138 MG/DL (ref 70–99)
POTASSIUM SERPL-SCNC: 4.2 MMOL/L (ref 3.7–5.3)
SODIUM SERPL-SCNC: 136 MMOL/L (ref 135–144)

## 2023-04-01 PROCEDURE — 2580000003 HC RX 258: Performed by: STUDENT IN AN ORGANIZED HEALTH CARE EDUCATION/TRAINING PROGRAM

## 2023-04-01 PROCEDURE — 80048 BASIC METABOLIC PNL TOTAL CA: CPT

## 2023-04-01 PROCEDURE — 6370000000 HC RX 637 (ALT 250 FOR IP): Performed by: SURGERY

## 2023-04-01 PROCEDURE — 6360000002 HC RX W HCPCS: Performed by: STUDENT IN AN ORGANIZED HEALTH CARE EDUCATION/TRAINING PROGRAM

## 2023-04-01 PROCEDURE — 2060000000 HC ICU INTERMEDIATE R&B

## 2023-04-01 PROCEDURE — 6370000000 HC RX 637 (ALT 250 FOR IP)

## 2023-04-01 PROCEDURE — 36415 COLL VENOUS BLD VENIPUNCTURE: CPT

## 2023-04-01 PROCEDURE — 99231 SBSQ HOSP IP/OBS SF/LOW 25: CPT | Performed by: INTERNAL MEDICINE

## 2023-04-01 RX ADMIN — DESMOPRESSIN ACETATE 40 MG: 0.2 TABLET ORAL at 08:08

## 2023-04-01 RX ADMIN — ACETAMINOPHEN 650 MG: 325 TABLET ORAL at 20:04

## 2023-04-01 RX ADMIN — ENOXAPARIN SODIUM 40 MG: 100 INJECTION SUBCUTANEOUS at 08:08

## 2023-04-01 RX ADMIN — FUROSEMIDE 20 MG: 20 TABLET ORAL at 08:08

## 2023-04-01 RX ADMIN — ASPIRIN 81 MG 81 MG: 81 TABLET ORAL at 08:08

## 2023-04-01 RX ADMIN — SODIUM CHLORIDE, PRESERVATIVE FREE 10 ML: 5 INJECTION INTRAVENOUS at 08:08

## 2023-04-01 RX ADMIN — ACETAMINOPHEN 650 MG: 325 TABLET ORAL at 04:47

## 2023-04-01 RX ADMIN — SODIUM CHLORIDE, PRESERVATIVE FREE 10 ML: 5 INJECTION INTRAVENOUS at 20:05

## 2023-04-01 RX ADMIN — METOPROLOL TARTRATE 12.5 MG: 25 TABLET, FILM COATED ORAL at 20:05

## 2023-04-01 ASSESSMENT — PAIN DESCRIPTION - LOCATION
LOCATION: LEG
LOCATION: GENERALIZED

## 2023-04-01 ASSESSMENT — PAIN SCALES - GENERAL
PAINLEVEL_OUTOF10: 3

## 2023-04-02 LAB
ANION GAP SERPL CALCULATED.3IONS-SCNC: 9 MMOL/L (ref 9–17)
BUN SERPL-MCNC: 15 MG/DL (ref 8–23)
CALCIUM SERPL-MCNC: 8.9 MG/DL (ref 8.6–10.4)
CHLORIDE SERPL-SCNC: 101 MMOL/L (ref 98–107)
CO2 SERPL-SCNC: 23 MMOL/L (ref 20–31)
CREAT SERPL-MCNC: 0.71 MG/DL (ref 0.5–0.9)
GFR SERPL CREATININE-BSD FRML MDRD: >60 ML/MIN/1.73M2
GLUCOSE SERPL-MCNC: 91 MG/DL (ref 70–99)
HCT VFR BLD AUTO: 38.9 % (ref 36.3–47.1)
HGB BLD-MCNC: 12.2 G/DL (ref 11.9–15.1)
MCH RBC QN AUTO: 29.1 PG (ref 25.2–33.5)
MCHC RBC AUTO-ENTMCNC: 31.4 G/DL (ref 28.4–34.8)
MCV RBC AUTO: 92.8 FL (ref 82.6–102.9)
NRBC AUTOMATED: 0 PER 100 WBC
PDW BLD-RTO: 13.3 % (ref 11.8–14.4)
PLATELET # BLD AUTO: 162 K/UL (ref 138–453)
PMV BLD AUTO: 11.9 FL (ref 8.1–13.5)
POTASSIUM SERPL-SCNC: 4.1 MMOL/L (ref 3.7–5.3)
RBC # BLD: 4.19 M/UL (ref 3.95–5.11)
SODIUM SERPL-SCNC: 133 MMOL/L (ref 135–144)
WBC # BLD AUTO: 5.1 K/UL (ref 3.5–11.3)

## 2023-04-02 PROCEDURE — 2580000003 HC RX 258: Performed by: STUDENT IN AN ORGANIZED HEALTH CARE EDUCATION/TRAINING PROGRAM

## 2023-04-02 PROCEDURE — 99231 SBSQ HOSP IP/OBS SF/LOW 25: CPT | Performed by: INTERNAL MEDICINE

## 2023-04-02 PROCEDURE — 2060000000 HC ICU INTERMEDIATE R&B

## 2023-04-02 PROCEDURE — 85027 COMPLETE CBC AUTOMATED: CPT

## 2023-04-02 PROCEDURE — 6360000002 HC RX W HCPCS: Performed by: STUDENT IN AN ORGANIZED HEALTH CARE EDUCATION/TRAINING PROGRAM

## 2023-04-02 PROCEDURE — 80048 BASIC METABOLIC PNL TOTAL CA: CPT

## 2023-04-02 PROCEDURE — 94761 N-INVAS EAR/PLS OXIMETRY MLT: CPT

## 2023-04-02 PROCEDURE — 6370000000 HC RX 637 (ALT 250 FOR IP): Performed by: SURGERY

## 2023-04-02 PROCEDURE — 6370000000 HC RX 637 (ALT 250 FOR IP)

## 2023-04-02 PROCEDURE — 36415 COLL VENOUS BLD VENIPUNCTURE: CPT

## 2023-04-02 RX ADMIN — METOPROLOL TARTRATE 12.5 MG: 25 TABLET, FILM COATED ORAL at 20:41

## 2023-04-02 RX ADMIN — DESMOPRESSIN ACETATE 40 MG: 0.2 TABLET ORAL at 08:01

## 2023-04-02 RX ADMIN — ASPIRIN 81 MG 81 MG: 81 TABLET ORAL at 08:01

## 2023-04-02 RX ADMIN — SODIUM CHLORIDE, PRESERVATIVE FREE 10 ML: 5 INJECTION INTRAVENOUS at 08:01

## 2023-04-02 RX ADMIN — SODIUM CHLORIDE, PRESERVATIVE FREE 10 ML: 5 INJECTION INTRAVENOUS at 20:48

## 2023-04-02 RX ADMIN — ACETAMINOPHEN 650 MG: 325 TABLET ORAL at 20:41

## 2023-04-02 RX ADMIN — FUROSEMIDE 20 MG: 20 TABLET ORAL at 08:01

## 2023-04-02 RX ADMIN — ENOXAPARIN SODIUM 40 MG: 100 INJECTION SUBCUTANEOUS at 08:01

## 2023-04-02 ASSESSMENT — PAIN DESCRIPTION - LOCATION: LOCATION: CHEST;LEG

## 2023-04-02 ASSESSMENT — PAIN SCALES - GENERAL: PAINLEVEL_OUTOF10: 3

## 2023-04-03 ENCOUNTER — ANESTHESIA EVENT (OUTPATIENT)
Dept: OPERATING ROOM | Age: 68
End: 2023-04-03
Payer: MEDICAID

## 2023-04-03 ENCOUNTER — APPOINTMENT (OUTPATIENT)
Dept: ULTRASOUND IMAGING | Age: 68
End: 2023-04-03
Payer: MEDICAID

## 2023-04-03 LAB
ABSOLUTE EOS #: 0.11 K/UL (ref 0–0.44)
ABSOLUTE IMMATURE GRANULOCYTE: <0.03 K/UL (ref 0–0.3)
ABSOLUTE LYMPH #: 2.6 K/UL (ref 1.1–3.7)
ABSOLUTE MONO #: 0.53 K/UL (ref 0.1–1.2)
ANION GAP SERPL CALCULATED.3IONS-SCNC: 9 MMOL/L (ref 9–17)
BASOPHILS # BLD: 1 % (ref 0–2)
BASOPHILS ABSOLUTE: 0.04 K/UL (ref 0–0.2)
BUN SERPL-MCNC: 15 MG/DL (ref 8–23)
CALCIUM SERPL-MCNC: 8.9 MG/DL (ref 8.6–10.4)
CHLORIDE SERPL-SCNC: 105 MMOL/L (ref 98–107)
CO2 SERPL-SCNC: 24 MMOL/L (ref 20–31)
CREAT SERPL-MCNC: 0.79 MG/DL (ref 0.5–0.9)
EOSINOPHILS RELATIVE PERCENT: 2 % (ref 1–4)
GFR SERPL CREATININE-BSD FRML MDRD: >60 ML/MIN/1.73M2
GLUCOSE SERPL-MCNC: 93 MG/DL (ref 70–99)
HCT VFR BLD AUTO: 38.2 % (ref 36.3–47.1)
HGB BLD-MCNC: 12.8 G/DL (ref 11.9–15.1)
IMMATURE GRANULOCYTES: 0 %
INR PPP: 1
LYMPHOCYTES # BLD: 42 % (ref 24–43)
MAGNESIUM SERPL-MCNC: 1.6 MG/DL (ref 1.6–2.6)
MCH RBC QN AUTO: 29.6 PG (ref 25.2–33.5)
MCHC RBC AUTO-ENTMCNC: 33.5 G/DL (ref 28.4–34.8)
MCV RBC AUTO: 88.2 FL (ref 82.6–102.9)
MONOCYTES # BLD: 9 % (ref 3–12)
NRBC AUTOMATED: 0 PER 100 WBC
PARTIAL THROMBOPLASTIN TIME: 36.5 SEC (ref 23–36.5)
PDW BLD-RTO: 13.1 % (ref 11.8–14.4)
PLATELET # BLD AUTO: 176 K/UL (ref 138–453)
PMV BLD AUTO: 10.5 FL (ref 8.1–13.5)
POTASSIUM SERPL-SCNC: 3.8 MMOL/L (ref 3.7–5.3)
PREALB SERPL-MCNC: 16.2 MG/DL (ref 20–40)
PROTHROMBIN TIME: 12.8 SEC (ref 11.7–14.9)
RBC # BLD: 4.33 M/UL (ref 3.95–5.11)
SEG NEUTROPHILS: 46 % (ref 36–65)
SEGMENTED NEUTROPHILS ABSOLUTE COUNT: 2.92 K/UL (ref 1.5–8.1)
SODIUM SERPL-SCNC: 138 MMOL/L (ref 135–144)
WBC # BLD AUTO: 6.2 K/UL (ref 3.5–11.3)

## 2023-04-03 PROCEDURE — 2580000003 HC RX 258: Performed by: STUDENT IN AN ORGANIZED HEALTH CARE EDUCATION/TRAINING PROGRAM

## 2023-04-03 PROCEDURE — 80048 BASIC METABOLIC PNL TOTAL CA: CPT

## 2023-04-03 PROCEDURE — 6370000000 HC RX 637 (ALT 250 FOR IP): Performed by: SURGERY

## 2023-04-03 PROCEDURE — 85610 PROTHROMBIN TIME: CPT

## 2023-04-03 PROCEDURE — 93926 LOWER EXTREMITY STUDY: CPT

## 2023-04-03 PROCEDURE — 6370000000 HC RX 637 (ALT 250 FOR IP)

## 2023-04-03 PROCEDURE — 84134 ASSAY OF PREALBUMIN: CPT

## 2023-04-03 PROCEDURE — 2060000000 HC ICU INTERMEDIATE R&B

## 2023-04-03 PROCEDURE — 99232 SBSQ HOSP IP/OBS MODERATE 35: CPT | Performed by: INTERNAL MEDICINE

## 2023-04-03 PROCEDURE — 83735 ASSAY OF MAGNESIUM: CPT

## 2023-04-03 PROCEDURE — 6360000002 HC RX W HCPCS: Performed by: STUDENT IN AN ORGANIZED HEALTH CARE EDUCATION/TRAINING PROGRAM

## 2023-04-03 PROCEDURE — 85025 COMPLETE CBC W/AUTO DIFF WBC: CPT

## 2023-04-03 PROCEDURE — 85730 THROMBOPLASTIN TIME PARTIAL: CPT

## 2023-04-03 PROCEDURE — 87641 MR-STAPH DNA AMP PROBE: CPT

## 2023-04-03 PROCEDURE — 83036 HEMOGLOBIN GLYCOSYLATED A1C: CPT

## 2023-04-03 PROCEDURE — 76882 US LMTD JT/FCL EVL NVASC XTR: CPT

## 2023-04-03 PROCEDURE — 36415 COLL VENOUS BLD VENIPUNCTURE: CPT

## 2023-04-03 RX ADMIN — SODIUM CHLORIDE, PRESERVATIVE FREE 10 ML: 5 INJECTION INTRAVENOUS at 08:13

## 2023-04-03 RX ADMIN — METOPROLOL TARTRATE 12.5 MG: 25 TABLET, FILM COATED ORAL at 21:15

## 2023-04-03 RX ADMIN — ASPIRIN 81 MG 81 MG: 81 TABLET ORAL at 08:12

## 2023-04-03 RX ADMIN — DESMOPRESSIN ACETATE 40 MG: 0.2 TABLET ORAL at 08:12

## 2023-04-03 RX ADMIN — METOPROLOL TARTRATE 12.5 MG: 25 TABLET, FILM COATED ORAL at 08:12

## 2023-04-03 RX ADMIN — FUROSEMIDE 20 MG: 20 TABLET ORAL at 08:12

## 2023-04-03 RX ADMIN — ENOXAPARIN SODIUM 40 MG: 100 INJECTION SUBCUTANEOUS at 08:12

## 2023-04-03 RX ADMIN — SODIUM CHLORIDE, PRESERVATIVE FREE 10 ML: 5 INJECTION INTRAVENOUS at 21:16

## 2023-04-03 NOTE — ANESTHESIA PRE PROCEDURE
Findings:           Anesthesia Plan      general     ASA 4       Induction: intravenous. arterial line, CVP, continuous noninvasive hemodynamic monitor, BIS, central line, LOU and PA catheter  MIPS: Postoperative ventilation. Anesthetic plan and risks discussed with patient. Use of blood products discussed with patient whom consented to blood products. Plan discussed with CRNA. per cards  Cardiac Cath 3/28/23   Angiographic Findings        Cardiac Arteries and Lesion Findings       LMCA: Normal 0% stenosis. LAD: has proximal 40% stenosis. The D1 is small and is normal. The D2 is   normal.     LCx: Normal 0% stenosis. The OM1 is normal. The OM2 has 20% stenosis. RCA: has high anterior origin with proximal 20% stenosis.      Coronary Tree        Dominance: Right       LV Analysis   LV function assessed as:Abnormal.   Ejection Fraction   +----------------------------------------------------------------------+---+   ! Method                                                                !EF%! +----------------------------------------------------------------------+---+   ! LV gram                                                               !30 !   +----------------------------------------------------------------------+---+      Procedure Summary        Normal biventricular filling pressures.    Low cardiac output.    Critical aortic stenosis.    Moderate LV systolic dysfunction; LVEF 30%.    Moderate disease of proximal LAD.    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        Recommendations        Medical therapy as needed.    Risk factor modification.   6 [facility] surgical consultation for SAVR.      Echo 3/28/23  Summary   Left ventricle is normal in size, global left ventricular systolic function   is moderately reduced, estimated LVEF 30-35%, global hypokinesia noted. Grade II (moderate) left ventricular diastolic dysfunction. Normal right ventricular size and function.    Heavily calcified

## 2023-04-04 ENCOUNTER — APPOINTMENT (OUTPATIENT)
Dept: GENERAL RADIOLOGY | Age: 68
End: 2023-04-04
Payer: MEDICAID

## 2023-04-04 ENCOUNTER — ANESTHESIA (OUTPATIENT)
Dept: OPERATING ROOM | Age: 68
End: 2023-04-04
Payer: MEDICAID

## 2023-04-04 LAB
ALLEN TEST: ABNORMAL
ANGLE TEG: 57.2 DEG (ref 63–78)
ANGLE TEG: 72.4 DEG (ref 63–78)
ANION GAP SERPL CALCULATED.3IONS-SCNC: 7 MMOL/L (ref 9–17)
ANION GAP SERPL CALCULATED.3IONS-SCNC: 7 MMOL/L (ref 9–17)
ANION GAP: 15 MMOL/L (ref 7–16)
BUN SERPL-MCNC: 11 MG/DL (ref 8–23)
BUN SERPL-MCNC: 13 MG/DL (ref 8–23)
CA-I BLD-SCNC: 1.12 MMOL/L (ref 1.13–1.33)
CALCIUM SERPL-MCNC: 6.9 MG/DL (ref 8.6–10.4)
CALCIUM SERPL-MCNC: 7.6 MG/DL (ref 8.6–10.4)
CHLORIDE SERPL-SCNC: 112 MMOL/L (ref 98–107)
CHLORIDE SERPL-SCNC: 114 MMOL/L (ref 98–107)
CO2 SERPL-SCNC: 21 MMOL/L (ref 20–31)
CO2 SERPL-SCNC: 23 MMOL/L (ref 20–31)
CREAT SERPL-MCNC: 0.67 MG/DL (ref 0.5–0.9)
CREAT SERPL-MCNC: 0.75 MG/DL (ref 0.5–0.9)
EGFR, POC: >60 ML/MIN/1.73M2
FIBRINOGEN, FUNCTIONAL TEG: 15.8 MM (ref 15–32)
FIBRINOGEN, FUNCTIONAL TEG: 29 MM (ref 15–32)
FIO2: 100
FIO2: 40
GFR SERPL CREATININE-BSD FRML MDRD: >60 ML/MIN/1.73M2
GFR SERPL CREATININE-BSD FRML MDRD: >60 ML/MIN/1.73M2
GLUCOSE BLD-MCNC: 104 MG/DL (ref 65–105)
GLUCOSE BLD-MCNC: 106 MG/DL (ref 74–100)
GLUCOSE BLD-MCNC: 106 MG/DL (ref 74–100)
GLUCOSE BLD-MCNC: 110 MG/DL (ref 74–100)
GLUCOSE BLD-MCNC: 111 MG/DL (ref 74–100)
GLUCOSE BLD-MCNC: 117 MG/DL (ref 74–100)
GLUCOSE BLD-MCNC: 121 MG/DL (ref 74–100)
GLUCOSE BLD-MCNC: 126 MG/DL (ref 74–100)
GLUCOSE BLD-MCNC: 129 MG/DL (ref 74–100)
GLUCOSE BLD-MCNC: 138 MG/DL (ref 65–105)
GLUCOSE BLD-MCNC: 138 MG/DL (ref 65–105)
GLUCOSE BLD-MCNC: 92 MG/DL (ref 65–105)
GLUCOSE BLD-MCNC: 92 MG/DL (ref 74–100)
GLUCOSE BLD-MCNC: 93 MG/DL (ref 74–100)
GLUCOSE BLD-MCNC: 96 MG/DL (ref 74–100)
GLUCOSE SERPL-MCNC: 102 MG/DL (ref 70–99)
GLUCOSE SERPL-MCNC: 149 MG/DL (ref 70–99)
HCT VFR BLD AUTO: 29.4 % (ref 36.3–47.1)
HCT VFR BLD AUTO: 39.5 % (ref 36.3–47.1)
HGB BLD-MCNC: 13 G/DL (ref 11.9–15.1)
HGB BLD-MCNC: 9.9 G/DL (ref 11.9–15.1)
INR PPP: 1.8
KINETICS TEG: 1.3 MIN (ref 0.8–2.1)
KINETICS TEG: 2.6 MIN (ref 0.8–2.1)
MA (MAX CLOT) TEG: 55.5 MM (ref 52–69)
MA (MAX CLOT) TEG: 66 MM (ref 52–69)
MA(MAX CLOT) RAPID TEG: 54.2 MM (ref 52–70)
MA(MAX CLOT) RAPID TEG: 67.2 MM (ref 52–70)
MAGNESIUM SERPL-MCNC: 2.6 MG/DL (ref 1.6–2.6)
MCH RBC QN AUTO: 29.8 PG (ref 25.2–33.5)
MCH RBC QN AUTO: 30.4 PG (ref 25.2–33.5)
MCHC RBC AUTO-ENTMCNC: 32.9 G/DL (ref 28.4–34.8)
MCHC RBC AUTO-ENTMCNC: 33.7 G/DL (ref 28.4–34.8)
MCV RBC AUTO: 90.2 FL (ref 82.6–102.9)
MCV RBC AUTO: 90.6 FL (ref 82.6–102.9)
MRSA, DNA, NASAL: NEGATIVE
NEGATIVE BASE EXCESS, ART: 1 (ref 0–2)
NEGATIVE BASE EXCESS, ART: 2 (ref 0–2)
NEGATIVE BASE EXCESS, ART: 3 (ref 0–2)
NEGATIVE BASE EXCESS, ART: 5 (ref 0–2)
NEGATIVE BASE EXCESS, ART: 6 (ref 0–2)
NEGATIVE BASE EXCESS, ART: 8 (ref 0–2)
NRBC AUTOMATED: 0 PER 100 WBC
NRBC AUTOMATED: 0 PER 100 WBC
O2 DEVICE/FLOW/%: ABNORMAL
PARTIAL THROMBOPLASTIN TIME: 50.9 SEC (ref 23–36.5)
PDW BLD-RTO: 13.2 % (ref 11.8–14.4)
PDW BLD-RTO: 13.3 % (ref 11.8–14.4)
PERFORMING LOCATION: ABNORMAL
PERFORMING LOCATION: NORMAL
PLATELET # BLD AUTO: 219 K/UL (ref 138–453)
PLATELET # BLD AUTO: ABNORMAL K/UL (ref 138–453)
PLATELET, FLUORESCENCE: 63 K/UL (ref 138–453)
PLATELET, IMMATURE FRACTION: 4.4 % (ref 1.1–10.3)
PMV BLD AUTO: 11.6 FL (ref 8.1–13.5)
POC BUN: 14 MG/DL (ref 8–26)
POC BUN: 16 MG/DL (ref 8–26)
POC CHLORIDE: 108 MMOL/L (ref 98–107)
POC CREATININE: 0.7 MG/DL (ref 0.51–1.19)
POC HCO3: 19 MMOL/L (ref 21–28)
POC HCO3: 19.9 MMOL/L (ref 21–28)
POC HCO3: 20.8 MMOL/L (ref 21–28)
POC HCO3: 21.7 MMOL/L (ref 21–28)
POC HCO3: 23.6 MMOL/L (ref 21–28)
POC HCO3: 24.2 MMOL/L (ref 21–28)
POC HCO3: 24.2 MMOL/L (ref 21–28)
POC HCO3: 25 MMOL/L (ref 21–28)
POC HCO3: 25.1 MMOL/L (ref 21–28)
POC HCO3: 25.4 MMOL/L (ref 21–28)
POC HCO3: 26.4 MMOL/L (ref 21–28)
POC HEMATOCRIT: 24 % (ref 36–46)
POC HEMATOCRIT: 25 % (ref 36–46)
POC HEMATOCRIT: 31 % (ref 36–46)
POC HEMATOCRIT: 37 % (ref 36–46)
POC HEMATOCRIT: 40 % (ref 36–46)
POC HEMOGLOBIN: 10.4 G/DL (ref 12–16)
POC HEMOGLOBIN: 12.5 G/DL (ref 12–16)
POC HEMOGLOBIN: 13.4 G/DL (ref 12–16)
POC HEMOGLOBIN: 8.1 G/DL (ref 12–16)
POC HEMOGLOBIN: 8.2 G/DL (ref 12–16)
POC HEMOGLOBIN: 8.3 G/DL (ref 12–16)
POC HEMOGLOBIN: 8.4 G/DL (ref 12–16)
POC IONIZED CALCIUM: 0.96 MMOL/L (ref 1.15–1.33)
POC IONIZED CALCIUM: 1 MMOL/L (ref 1.15–1.33)
POC IONIZED CALCIUM: 1.02 MMOL/L (ref 1.15–1.33)
POC IONIZED CALCIUM: 1.2 MMOL/L (ref 1.15–1.33)
POC IONIZED CALCIUM: 1.21 MMOL/L (ref 1.15–1.33)
POC IONIZED CALCIUM: 1.23 MMOL/L (ref 1.15–1.33)
POC IONIZED CALCIUM: 1.32 MMOL/L (ref 1.15–1.33)
POC LACTIC ACID: 0.92 MMOL/L (ref 0.56–1.39)
POC LACTIC ACID: 0.98 MMOL/L (ref 0.56–1.39)
POC O2 SATURATION: 100 % (ref 94–98)
POC O2 SATURATION: 96 % (ref 94–98)
POC O2 SATURATION: 98 % (ref 94–98)
POC O2 SATURATION: 98 % (ref 94–98)
POC O2 SATURATION: 99 % (ref 94–98)
POC PCO2: 31.8 MM HG (ref 35–48)
POC PCO2: 34.5 MM HG (ref 35–48)
POC PCO2: 34.9 MM HG (ref 35–48)
POC PCO2: 37 MM HG (ref 35–48)
POC PCO2: 39.8 MM HG (ref 35–48)
POC PCO2: 40.8 MM HG (ref 35–48)
POC PCO2: 41.5 MM HG (ref 35–48)
POC PCO2: 41.6 MM HG (ref 35–48)
POC PCO2: 43.6 MM HG (ref 35–48)
POC PCO2: 45 MM HG (ref 35–48)
POC PCO2: 46.8 MM HG (ref 35–48)
POC PH: 7.23 (ref 7.35–7.45)
POC PH: 7.29 (ref 7.35–7.45)
POC PH: 7.32 (ref 7.35–7.45)
POC PH: 7.34 (ref 7.35–7.45)
POC PH: 7.34 (ref 7.35–7.45)
POC PH: 7.38 (ref 7.35–7.45)
POC PH: 7.41 (ref 7.35–7.45)
POC PH: 7.41 (ref 7.35–7.45)
POC PH: 7.45 (ref 7.35–7.45)
POC PH: 7.47 (ref 7.35–7.45)
POC PH: 7.49 (ref 7.35–7.45)
POC PO2: 111.3 MM HG (ref 83–108)
POC PO2: 115.6 MM HG (ref 83–108)
POC PO2: 130.1 MM HG (ref 83–108)
POC PO2: 221.2 MM HG (ref 83–108)
POC PO2: 244.6 MM HG (ref 83–108)
POC PO2: 284.6 MM HG (ref 83–108)
POC PO2: 378.1 MM HG (ref 83–108)
POC PO2: 381.8 MM HG (ref 83–108)
POC PO2: 438.8 MM HG (ref 83–108)
POC PO2: 569.7 MM HG (ref 83–108)
POC PO2: 78.5 MM HG (ref 83–108)
POC POTASSIUM: 3.8 MMOL/L (ref 3.5–4.5)
POC POTASSIUM: 4.1 MMOL/L (ref 3.5–4.5)
POC POTASSIUM: 4.2 MMOL/L (ref 3.5–4.5)
POC POTASSIUM: 4.6 MMOL/L (ref 3.5–4.5)
POC POTASSIUM: 4.7 MMOL/L (ref 3.5–4.5)
POC POTASSIUM: 4.9 MMOL/L (ref 3.5–4.5)
POC POTASSIUM: 4.9 MMOL/L (ref 3.5–4.5)
POC SODIUM: 141 MMOL/L (ref 138–146)
POC SODIUM: 142 MMOL/L (ref 138–146)
POC SODIUM: 143 MMOL/L (ref 138–146)
POC TCO2: 21 MMOL/L (ref 22–30)
POC TCO2: 24 MMOL/L (ref 22–30)
POC TCO2: 24 MMOL/L (ref 22–30)
POSITIVE BASE EXCESS, ART: 0 (ref 0–3)
POSITIVE BASE EXCESS, ART: 0 (ref 0–3)
POSITIVE BASE EXCESS, ART: 1 (ref 0–3)
POSITIVE BASE EXCESS, ART: 2 (ref 0–3)
POSITIVE BASE EXCESS, ART: 2 (ref 0–3)
POTASSIUM SERPL-SCNC: 3.9 MMOL/L (ref 3.7–5.3)
POTASSIUM SERPL-SCNC: 4.1 MMOL/L (ref 3.7–5.3)
PROTHROMBIN TIME: 21.2 SEC (ref 11.7–14.9)
RBC # BLD: 3.26 M/UL (ref 3.95–5.11)
RBC # BLD: 4.36 M/UL (ref 3.95–5.11)
REACTION TIME TEG W HEPARIN: 6.8 MIN (ref 4.3–8.3)
REACTION TIME TEG W HEPARIN: >17 MIN (ref 4.3–8.3)
REACTION TIME TEG: 7.6 MIN (ref 4.6–9.1)
REACTION TIME TEG: >17 MIN (ref 4.6–9.1)
SAMPLE SITE: ABNORMAL
SODIUM SERPL-SCNC: 140 MMOL/L (ref 135–144)
SODIUM SERPL-SCNC: 144 MMOL/L (ref 135–144)
SPECIMEN DESCRIPTION: NORMAL
WBC # BLD AUTO: 5.6 K/UL (ref 3.5–11.3)
WBC # BLD AUTO: 7.6 K/UL (ref 3.5–11.3)

## 2023-04-04 PROCEDURE — 3700000001 HC ADD 15 MINUTES (ANESTHESIA): Performed by: THORACIC SURGERY (CARDIOTHORACIC VASCULAR SURGERY)

## 2023-04-04 PROCEDURE — 83735 ASSAY OF MAGNESIUM: CPT

## 2023-04-04 PROCEDURE — 94002 VENT MGMT INPAT INIT DAY: CPT

## 2023-04-04 PROCEDURE — 2100000001 HC CVICU R&B

## 2023-04-04 PROCEDURE — A4216 STERILE WATER/SALINE, 10 ML: HCPCS | Performed by: NURSE PRACTITIONER

## 2023-04-04 PROCEDURE — 2720000010 HC SURG SUPPLY STERILE: Performed by: THORACIC SURGERY (CARDIOTHORACIC VASCULAR SURGERY)

## 2023-04-04 PROCEDURE — 93005 ELECTROCARDIOGRAM TRACING: CPT | Performed by: THORACIC SURGERY (CARDIOTHORACIC VASCULAR SURGERY)

## 2023-04-04 PROCEDURE — 6370000000 HC RX 637 (ALT 250 FOR IP): Performed by: NURSE PRACTITIONER

## 2023-04-04 PROCEDURE — C1729 CATH, DRAINAGE: HCPCS | Performed by: THORACIC SURGERY (CARDIOTHORACIC VASCULAR SURGERY)

## 2023-04-04 PROCEDURE — 6360000002 HC RX W HCPCS: Performed by: ANESTHESIOLOGY

## 2023-04-04 PROCEDURE — 6360000002 HC RX W HCPCS: Performed by: NURSE PRACTITIONER

## 2023-04-04 PROCEDURE — 36600 WITHDRAWAL OF ARTERIAL BLOOD: CPT

## 2023-04-04 PROCEDURE — P9041 ALBUMIN (HUMAN),5%, 50ML: HCPCS | Performed by: NURSE PRACTITIONER

## 2023-04-04 PROCEDURE — 82803 BLOOD GASES ANY COMBINATION: CPT

## 2023-04-04 PROCEDURE — 3700000000 HC ANESTHESIA ATTENDED CARE: Performed by: THORACIC SURGERY (CARDIOTHORACIC VASCULAR SURGERY)

## 2023-04-04 PROCEDURE — 85014 HEMATOCRIT: CPT

## 2023-04-04 PROCEDURE — 85027 COMPLETE CBC AUTOMATED: CPT

## 2023-04-04 PROCEDURE — 3600000018 HC SURGERY OHS ADDTL 15MIN: Performed by: THORACIC SURGERY (CARDIOTHORACIC VASCULAR SURGERY)

## 2023-04-04 PROCEDURE — 7100000001 HC PACU RECOVERY - ADDTL 15 MIN

## 2023-04-04 PROCEDURE — 6360000002 HC RX W HCPCS: Performed by: THORACIC SURGERY (CARDIOTHORACIC VASCULAR SURGERY)

## 2023-04-04 PROCEDURE — 2580000003 HC RX 258: Performed by: NURSE PRACTITIONER

## 2023-04-04 PROCEDURE — 88305 TISSUE EXAM BY PATHOLOGIST: CPT

## 2023-04-04 PROCEDURE — 85347 COAGULATION TIME ACTIVATED: CPT

## 2023-04-04 PROCEDURE — 82330 ASSAY OF CALCIUM: CPT

## 2023-04-04 PROCEDURE — 2500000003 HC RX 250 WO HCPCS

## 2023-04-04 PROCEDURE — 6370000000 HC RX 637 (ALT 250 FOR IP)

## 2023-04-04 PROCEDURE — 86850 RBC ANTIBODY SCREEN: CPT

## 2023-04-04 PROCEDURE — 85384 FIBRINOGEN ACTIVITY: CPT

## 2023-04-04 PROCEDURE — 6360000002 HC RX W HCPCS

## 2023-04-04 PROCEDURE — 88311 DECALCIFY TISSUE: CPT

## 2023-04-04 PROCEDURE — 37799 UNLISTED PX VASCULAR SURGERY: CPT

## 2023-04-04 PROCEDURE — 85576 BLOOD PLATELET AGGREGATION: CPT

## 2023-04-04 PROCEDURE — 7100000000 HC PACU RECOVERY - FIRST 15 MIN

## 2023-04-04 PROCEDURE — 82947 ASSAY GLUCOSE BLOOD QUANT: CPT

## 2023-04-04 PROCEDURE — 82374 ASSAY BLOOD CARBON DIOXIDE: CPT

## 2023-04-04 PROCEDURE — 86900 BLOOD TYPING SEROLOGIC ABO: CPT

## 2023-04-04 PROCEDURE — 83605 ASSAY OF LACTIC ACID: CPT

## 2023-04-04 PROCEDURE — 86901 BLOOD TYPING SEROLOGIC RH(D): CPT

## 2023-04-04 PROCEDURE — 2500000003 HC RX 250 WO HCPCS: Performed by: ANESTHESIOLOGY

## 2023-04-04 PROCEDURE — 71045 X-RAY EXAM CHEST 1 VIEW: CPT

## 2023-04-04 PROCEDURE — 85610 PROTHROMBIN TIME: CPT

## 2023-04-04 PROCEDURE — 80051 ELECTROLYTE PANEL: CPT

## 2023-04-04 PROCEDURE — 82565 ASSAY OF CREATININE: CPT

## 2023-04-04 PROCEDURE — 36415 COLL VENOUS BLD VENIPUNCTURE: CPT

## 2023-04-04 PROCEDURE — 80048 BASIC METABOLIC PNL TOTAL CA: CPT

## 2023-04-04 PROCEDURE — 2780000006 HC MISC HEART VALVE: Performed by: THORACIC SURGERY (CARDIOTHORACIC VASCULAR SURGERY)

## 2023-04-04 PROCEDURE — C1713 ANCHOR/SCREW BN/BN,TIS/BN: HCPCS | Performed by: THORACIC SURGERY (CARDIOTHORACIC VASCULAR SURGERY)

## 2023-04-04 PROCEDURE — 3600000008 HC SURGERY OHS BASE: Performed by: THORACIC SURGERY (CARDIOTHORACIC VASCULAR SURGERY)

## 2023-04-04 PROCEDURE — C9113 INJ PANTOPRAZOLE SODIUM, VIA: HCPCS | Performed by: NURSE PRACTITIONER

## 2023-04-04 PROCEDURE — 84295 ASSAY OF SERUM SODIUM: CPT

## 2023-04-04 PROCEDURE — 2580000003 HC RX 258: Performed by: THORACIC SURGERY (CARDIOTHORACIC VASCULAR SURGERY)

## 2023-04-04 PROCEDURE — 2580000003 HC RX 258: Performed by: ANESTHESIOLOGY

## 2023-04-04 PROCEDURE — 2500000003 HC RX 250 WO HCPCS: Performed by: NURSE PRACTITIONER

## 2023-04-04 PROCEDURE — 85055 RETICULATED PLATELET ASSAY: CPT

## 2023-04-04 PROCEDURE — 85730 THROMBOPLASTIN TIME PARTIAL: CPT

## 2023-04-04 PROCEDURE — 2709999900 HC NON-CHARGEABLE SUPPLY: Performed by: THORACIC SURGERY (CARDIOTHORACIC VASCULAR SURGERY)

## 2023-04-04 PROCEDURE — 86920 COMPATIBILITY TEST SPIN: CPT

## 2023-04-04 PROCEDURE — 84520 ASSAY OF UREA NITROGEN: CPT

## 2023-04-04 PROCEDURE — 2700000000 HC OXYGEN THERAPY PER DAY

## 2023-04-04 PROCEDURE — 84132 ASSAY OF SERUM POTASSIUM: CPT

## 2023-04-04 PROCEDURE — 2580000003 HC RX 258

## 2023-04-04 DEVICE — VALVE AORT 21MM REPL RESILIENT BOV PERICARD CO CHROMIUM ALLY: Type: IMPLANTABLE DEVICE | Site: HEART | Status: FUNCTIONAL

## 2023-04-04 DEVICE — IMPLANTABLE DEVICE: Type: IMPLANTABLE DEVICE | Site: CHEST | Status: FUNCTIONAL

## 2023-04-04 DEVICE — DEVICE STRNL CLSR MULTIIMPLANT STRNLOCK 360: Type: IMPLANTABLE DEVICE | Status: FUNCTIONAL

## 2023-04-04 DEVICE — CLIP INT M L GRN TI TRNSVRS GRV CHEVRON SHP W/ PRECIS TIP: Type: IMPLANTABLE DEVICE | Site: CHEST | Status: FUNCTIONAL

## 2023-04-04 DEVICE — CLIP INT SM WIDE WECK RED TI TRNSVRS GRV CHEVRON SHP W/ PERCIS TIP 24 PER PK: Type: IMPLANTABLE DEVICE | Status: FUNCTIONAL

## 2023-04-04 RX ORDER — ETOMIDATE 2 MG/ML
INJECTION INTRAVENOUS
Status: COMPLETED
Start: 2023-04-04 | End: 2023-04-04

## 2023-04-04 RX ORDER — ETOMIDATE 2 MG/ML
INJECTION INTRAVENOUS PRN
Status: DISCONTINUED | OUTPATIENT
Start: 2023-04-04 | End: 2023-04-04 | Stop reason: SDUPTHER

## 2023-04-04 RX ORDER — HEPARIN SODIUM 1000 [USP'U]/ML
INJECTION, SOLUTION INTRAVENOUS; SUBCUTANEOUS PRN
Status: DISCONTINUED | OUTPATIENT
Start: 2023-04-04 | End: 2023-04-04 | Stop reason: SDUPTHER

## 2023-04-04 RX ORDER — VANCOMYCIN HYDROCHLORIDE 1 G/20ML
INJECTION, POWDER, LYOPHILIZED, FOR SOLUTION INTRAVENOUS PRN
Status: DISCONTINUED | OUTPATIENT
Start: 2023-04-04 | End: 2023-04-04 | Stop reason: ALTCHOICE

## 2023-04-04 RX ORDER — ONDANSETRON 2 MG/ML
4 INJECTION INTRAMUSCULAR; INTRAVENOUS EVERY 6 HOURS PRN
Status: DISCONTINUED | OUTPATIENT
Start: 2023-04-04 | End: 2023-04-08 | Stop reason: HOSPADM

## 2023-04-04 RX ORDER — HYDRALAZINE HYDROCHLORIDE 20 MG/ML
5 INJECTION INTRAMUSCULAR; INTRAVENOUS EVERY 5 MIN PRN
Status: DISCONTINUED | OUTPATIENT
Start: 2023-04-04 | End: 2023-04-08 | Stop reason: HOSPADM

## 2023-04-04 RX ORDER — PROPOFOL 10 MG/ML
INJECTION, EMULSION INTRAVENOUS
Status: COMPLETED
Start: 2023-04-04 | End: 2023-04-04

## 2023-04-04 RX ORDER — MAGNESIUM HYDROXIDE 1200 MG/15ML
LIQUID ORAL CONTINUOUS PRN
Status: COMPLETED | OUTPATIENT
Start: 2023-04-04 | End: 2023-04-04

## 2023-04-04 RX ORDER — CHLORHEXIDINE GLUCONATE 4 G/100ML
SOLUTION TOPICAL SEE ADMIN INSTRUCTIONS
Status: DISCONTINUED | OUTPATIENT
Start: 2023-04-04 | End: 2023-04-04 | Stop reason: HOSPADM

## 2023-04-04 RX ORDER — PROTAMINE SULFATE 10 MG/ML
INJECTION, SOLUTION INTRAVENOUS
Status: COMPLETED
Start: 2023-04-04 | End: 2023-04-04

## 2023-04-04 RX ORDER — SODIUM CHLORIDE 9 MG/ML
INJECTION, SOLUTION INTRAVENOUS CONTINUOUS PRN
Status: DISCONTINUED | OUTPATIENT
Start: 2023-04-04 | End: 2023-04-04 | Stop reason: SDUPTHER

## 2023-04-04 RX ORDER — SODIUM CHLORIDE, SODIUM LACTATE, POTASSIUM CHLORIDE, CALCIUM CHLORIDE 600; 310; 30; 20 MG/100ML; MG/100ML; MG/100ML; MG/100ML
INJECTION, SOLUTION INTRAVENOUS CONTINUOUS PRN
Status: DISCONTINUED | OUTPATIENT
Start: 2023-04-04 | End: 2023-04-04 | Stop reason: SDUPTHER

## 2023-04-04 RX ORDER — SENNA AND DOCUSATE SODIUM 50; 8.6 MG/1; MG/1
1 TABLET, FILM COATED ORAL 2 TIMES DAILY
Status: DISCONTINUED | OUTPATIENT
Start: 2023-04-04 | End: 2023-04-08 | Stop reason: HOSPADM

## 2023-04-04 RX ORDER — AMIODARONE HYDROCHLORIDE 200 MG/1
200 TABLET ORAL 3 TIMES DAILY
Status: DISCONTINUED | OUTPATIENT
Start: 2023-04-04 | End: 2023-04-08 | Stop reason: HOSPADM

## 2023-04-04 RX ORDER — PROPOFOL 10 MG/ML
10 INJECTION, EMULSION INTRAVENOUS CONTINUOUS
Status: DISCONTINUED | OUTPATIENT
Start: 2023-04-04 | End: 2023-04-05

## 2023-04-04 RX ORDER — PROPOFOL 10 MG/ML
INJECTION, EMULSION INTRAVENOUS CONTINUOUS PRN
Status: DISCONTINUED | OUTPATIENT
Start: 2023-04-04 | End: 2023-04-04 | Stop reason: SDUPTHER

## 2023-04-04 RX ORDER — FENTANYL CITRATE 50 UG/ML
25 INJECTION, SOLUTION INTRAMUSCULAR; INTRAVENOUS
Status: DISCONTINUED | OUTPATIENT
Start: 2023-04-04 | End: 2023-04-08 | Stop reason: HOSPADM

## 2023-04-04 RX ORDER — LIDOCAINE HYDROCHLORIDE 10 MG/ML
INJECTION, SOLUTION EPIDURAL; INFILTRATION; INTRACAUDAL; PERINEURAL PRN
Status: DISCONTINUED | OUTPATIENT
Start: 2023-04-04 | End: 2023-04-04 | Stop reason: SDUPTHER

## 2023-04-04 RX ORDER — ALBUMIN, HUMAN INJ 5% 5 %
25 SOLUTION INTRAVENOUS PRN
Status: DISCONTINUED | OUTPATIENT
Start: 2023-04-04 | End: 2023-04-08 | Stop reason: HOSPADM

## 2023-04-04 RX ORDER — POTASSIUM CHLORIDE 29.8 MG/ML
20 INJECTION INTRAVENOUS PRN
Status: DISCONTINUED | OUTPATIENT
Start: 2023-04-04 | End: 2023-04-08 | Stop reason: HOSPADM

## 2023-04-04 RX ORDER — CLOPIDOGREL BISULFATE 75 MG/1
75 TABLET ORAL DAILY
Status: DISCONTINUED | OUTPATIENT
Start: 2023-04-05 | End: 2023-04-05

## 2023-04-04 RX ORDER — METOPROLOL TARTRATE 5 MG/5ML
2.5 INJECTION INTRAVENOUS EVERY 10 MIN PRN
Status: DISCONTINUED | OUTPATIENT
Start: 2023-04-04 | End: 2023-04-05

## 2023-04-04 RX ORDER — OXYCODONE HYDROCHLORIDE AND ACETAMINOPHEN 5; 325 MG/1; MG/1
2 TABLET ORAL EVERY 4 HOURS PRN
Status: DISCONTINUED | OUTPATIENT
Start: 2023-04-04 | End: 2023-04-08 | Stop reason: HOSPADM

## 2023-04-04 RX ORDER — INSULIN LISPRO 100 [IU]/ML
0-3 INJECTION, SOLUTION INTRAVENOUS; SUBCUTANEOUS NIGHTLY
Status: DISCONTINUED | OUTPATIENT
Start: 2023-04-04 | End: 2023-04-08 | Stop reason: HOSPADM

## 2023-04-04 RX ORDER — ONDANSETRON 4 MG/1
4 TABLET, ORALLY DISINTEGRATING ORAL EVERY 8 HOURS PRN
Status: DISCONTINUED | OUTPATIENT
Start: 2023-04-04 | End: 2023-04-08 | Stop reason: HOSPADM

## 2023-04-04 RX ORDER — CHLORHEXIDINE GLUCONATE 0.12 MG/ML
15 RINSE ORAL ONCE
Status: COMPLETED | OUTPATIENT
Start: 2023-04-04 | End: 2023-04-04

## 2023-04-04 RX ORDER — GLYCOPYRROLATE 0.2 MG/ML
INJECTION INTRAMUSCULAR; INTRAVENOUS PRN
Status: DISCONTINUED | OUTPATIENT
Start: 2023-04-04 | End: 2023-04-04 | Stop reason: SDUPTHER

## 2023-04-04 RX ORDER — SODIUM CHLORIDE 9 MG/ML
INJECTION, SOLUTION INTRAVENOUS PRN
Status: DISCONTINUED | OUTPATIENT
Start: 2023-04-04 | End: 2023-04-08 | Stop reason: HOSPADM

## 2023-04-04 RX ORDER — INSULIN GLARGINE 100 [IU]/ML
0.15 INJECTION, SOLUTION SUBCUTANEOUS NIGHTLY
Status: DISCONTINUED | OUTPATIENT
Start: 2023-04-05 | End: 2023-04-08 | Stop reason: HOSPADM

## 2023-04-04 RX ORDER — AMIODARONE HYDROCHLORIDE 200 MG/1
200 TABLET ORAL ONCE
Status: COMPLETED | OUTPATIENT
Start: 2023-04-04 | End: 2023-04-04

## 2023-04-04 RX ORDER — VANCOMYCIN HYDROCHLORIDE 1 G/20ML
INJECTION, POWDER, LYOPHILIZED, FOR SOLUTION INTRAVENOUS
Status: DISCONTINUED
Start: 2023-04-04 | End: 2023-04-04

## 2023-04-04 RX ORDER — ROCURONIUM BROMIDE 10 MG/ML
INJECTION, SOLUTION INTRAVENOUS PRN
Status: DISCONTINUED | OUTPATIENT
Start: 2023-04-04 | End: 2023-04-04 | Stop reason: SDUPTHER

## 2023-04-04 RX ORDER — INSULIN LISPRO 100 [IU]/ML
0-6 INJECTION, SOLUTION INTRAVENOUS; SUBCUTANEOUS
Status: DISCONTINUED | OUTPATIENT
Start: 2023-04-04 | End: 2023-04-08 | Stop reason: HOSPADM

## 2023-04-04 RX ORDER — FENTANYL CITRATE 0.05 MG/ML
INJECTION, SOLUTION INTRAMUSCULAR; INTRAVENOUS PRN
Status: DISCONTINUED | OUTPATIENT
Start: 2023-04-04 | End: 2023-04-04 | Stop reason: SDUPTHER

## 2023-04-04 RX ORDER — DIPHENHYDRAMINE HCL 25 MG
25 TABLET ORAL NIGHTLY PRN
Status: DISCONTINUED | OUTPATIENT
Start: 2023-04-05 | End: 2023-04-08 | Stop reason: HOSPADM

## 2023-04-04 RX ORDER — MIDAZOLAM HYDROCHLORIDE 1 MG/ML
INJECTION INTRAMUSCULAR; INTRAVENOUS PRN
Status: DISCONTINUED | OUTPATIENT
Start: 2023-04-04 | End: 2023-04-04 | Stop reason: SDUPTHER

## 2023-04-04 RX ORDER — SODIUM CHLORIDE 0.9 % (FLUSH) 0.9 %
5-40 SYRINGE (ML) INJECTION PRN
Status: DISCONTINUED | OUTPATIENT
Start: 2023-04-04 | End: 2023-04-08 | Stop reason: HOSPADM

## 2023-04-04 RX ORDER — OXYCODONE HYDROCHLORIDE AND ACETAMINOPHEN 5; 325 MG/1; MG/1
1 TABLET ORAL EVERY 4 HOURS PRN
Status: DISCONTINUED | OUTPATIENT
Start: 2023-04-04 | End: 2023-04-08 | Stop reason: HOSPADM

## 2023-04-04 RX ORDER — SODIUM CHLORIDE 9 MG/ML
INJECTION, SOLUTION INTRAVENOUS CONTINUOUS
Status: DISCONTINUED | OUTPATIENT
Start: 2023-04-04 | End: 2023-04-04

## 2023-04-04 RX ORDER — IPRATROPIUM BROMIDE AND ALBUTEROL SULFATE 2.5; .5 MG/3ML; MG/3ML
1 SOLUTION RESPIRATORY (INHALATION) EVERY 4 HOURS PRN
Status: DISCONTINUED | OUTPATIENT
Start: 2023-04-04 | End: 2023-04-08 | Stop reason: HOSPADM

## 2023-04-04 RX ORDER — PHENYLEPHRINE HCL IN 0.9% NACL 1 MG/10 ML
SYRINGE (ML) INTRAVENOUS PRN
Status: DISCONTINUED | OUTPATIENT
Start: 2023-04-04 | End: 2023-04-04 | Stop reason: SDUPTHER

## 2023-04-04 RX ORDER — PROTAMINE SULFATE 10 MG/ML
INJECTION, SOLUTION INTRAVENOUS PRN
Status: DISCONTINUED | OUTPATIENT
Start: 2023-04-04 | End: 2023-04-04 | Stop reason: SDUPTHER

## 2023-04-04 RX ORDER — NOREPINEPHRINE BIT/0.9 % NACL 16MG/250ML
.01-.08 INFUSION BOTTLE (ML) INTRAVENOUS CONTINUOUS PRN
Status: DISCONTINUED | OUTPATIENT
Start: 2023-04-04 | End: 2023-04-05

## 2023-04-04 RX ORDER — PANTOPRAZOLE SODIUM 40 MG/1
40 TABLET, DELAYED RELEASE ORAL DAILY
Status: DISCONTINUED | OUTPATIENT
Start: 2023-04-05 | End: 2023-04-08 | Stop reason: HOSPADM

## 2023-04-04 RX ORDER — FENTANYL CITRATE 50 UG/ML
50 INJECTION, SOLUTION INTRAMUSCULAR; INTRAVENOUS
Status: DISCONTINUED | OUTPATIENT
Start: 2023-04-04 | End: 2023-04-08 | Stop reason: HOSPADM

## 2023-04-04 RX ORDER — BISACODYL 10 MG
10 SUPPOSITORY, RECTAL RECTAL DAILY PRN
Status: DISCONTINUED | OUTPATIENT
Start: 2023-04-04 | End: 2023-04-08 | Stop reason: HOSPADM

## 2023-04-04 RX ORDER — POLYETHYLENE GLYCOL 3350 17 G/17G
17 POWDER, FOR SOLUTION ORAL DAILY
Status: DISCONTINUED | OUTPATIENT
Start: 2023-04-04 | End: 2023-04-08 | Stop reason: HOSPADM

## 2023-04-04 RX ORDER — MAGNESIUM SULFATE IN WATER 40 MG/ML
2000 INJECTION, SOLUTION INTRAVENOUS PRN
Status: DISCONTINUED | OUTPATIENT
Start: 2023-04-04 | End: 2023-04-08 | Stop reason: HOSPADM

## 2023-04-04 RX ORDER — MEPERIDINE HYDROCHLORIDE 50 MG/ML
25 INJECTION INTRAMUSCULAR; INTRAVENOUS; SUBCUTANEOUS
Status: ACTIVE | OUTPATIENT
Start: 2023-04-04 | End: 2023-04-05

## 2023-04-04 RX ORDER — ASPIRIN 81 MG/1
81 TABLET ORAL DAILY
Status: DISCONTINUED | OUTPATIENT
Start: 2023-04-04 | End: 2023-04-08 | Stop reason: HOSPADM

## 2023-04-04 RX ORDER — ASPIRIN 81 MG/1
81 TABLET ORAL ONCE
Status: DISCONTINUED | OUTPATIENT
Start: 2023-04-04 | End: 2023-04-04

## 2023-04-04 RX ORDER — DEXTROSE MONOHYDRATE 100 MG/ML
INJECTION, SOLUTION INTRAVENOUS CONTINUOUS PRN
Status: DISCONTINUED | OUTPATIENT
Start: 2023-04-04 | End: 2023-04-08 | Stop reason: HOSPADM

## 2023-04-04 RX ORDER — WATER 1000 ML/1000ML
INJECTION, SOLUTION INTRAVENOUS
Status: DISCONTINUED
Start: 2023-04-04 | End: 2023-04-04

## 2023-04-04 RX ORDER — ALBUMIN (HUMAN) 12.5 G/50ML
SOLUTION INTRAVENOUS
Status: DISCONTINUED
Start: 2023-04-04 | End: 2023-04-04 | Stop reason: WASHOUT

## 2023-04-04 RX ORDER — SODIUM CHLORIDE 0.9 % (FLUSH) 0.9 %
5-40 SYRINGE (ML) INJECTION EVERY 12 HOURS SCHEDULED
Status: DISCONTINUED | OUTPATIENT
Start: 2023-04-04 | End: 2023-04-08 | Stop reason: HOSPADM

## 2023-04-04 RX ORDER — ATORVASTATIN CALCIUM 40 MG/1
20 TABLET, FILM COATED ORAL NIGHTLY
Status: DISCONTINUED | OUTPATIENT
Start: 2023-04-05 | End: 2023-04-08 | Stop reason: HOSPADM

## 2023-04-04 RX ORDER — ALBUMIN (HUMAN) 12.5 G/50ML
25 SOLUTION INTRAVENOUS PRN
Status: DISCONTINUED | OUTPATIENT
Start: 2023-04-04 | End: 2023-04-08 | Stop reason: HOSPADM

## 2023-04-04 RX ADMIN — Medication 50 MCG: at 09:56

## 2023-04-04 RX ADMIN — Medication 2000 MG: at 20:33

## 2023-04-04 RX ADMIN — ALBUMIN (HUMAN) 25 G: 12.5 INJECTION, SOLUTION INTRAVENOUS at 13:27

## 2023-04-04 RX ADMIN — Medication 50 MCG: at 12:50

## 2023-04-04 RX ADMIN — Medication 1000 MG: at 23:01

## 2023-04-04 RX ADMIN — FENTANYL CITRATE 50 MCG: 50 INJECTION, SOLUTION INTRAMUSCULAR; INTRAVENOUS at 14:09

## 2023-04-04 RX ADMIN — FENTANYL CITRATE 250 MCG: 50 INJECTION INTRAVENOUS at 10:25

## 2023-04-04 RX ADMIN — FENTANYL CITRATE 100 MCG: 50 INJECTION INTRAVENOUS at 10:38

## 2023-04-04 RX ADMIN — PANTOPRAZOLE SODIUM 40 MG: 40 INJECTION, POWDER, FOR SOLUTION INTRAVENOUS at 15:22

## 2023-04-04 RX ADMIN — Medication 100 MCG: at 09:36

## 2023-04-04 RX ADMIN — ETOMIDATE INJECTION 20 MG: 2 SOLUTION INTRAVENOUS at 09:24

## 2023-04-04 RX ADMIN — CHLORHEXIDINE GLUCONATE 0.12% ORAL RINSE 15 ML: 1.2 LIQUID ORAL at 08:39

## 2023-04-04 RX ADMIN — ASPIRIN 81 MG 81 MG: 81 TABLET ORAL at 08:39

## 2023-04-04 RX ADMIN — ROCURONIUM BROMIDE 30 MG: 10 INJECTION, SOLUTION INTRAVENOUS at 11:55

## 2023-04-04 RX ADMIN — SODIUM CHLORIDE, POTASSIUM CHLORIDE, SODIUM LACTATE AND CALCIUM CHLORIDE: 600; 310; 30; 20 INJECTION, SOLUTION INTRAVENOUS at 09:15

## 2023-04-04 RX ADMIN — Medication 25 MCG: at 13:05

## 2023-04-04 RX ADMIN — POTASSIUM CHLORIDE 20 MEQ: 29.8 INJECTION, SOLUTION INTRAVENOUS at 16:15

## 2023-04-04 RX ADMIN — Medication 50 MCG: at 10:25

## 2023-04-04 RX ADMIN — PROTAMINE SULFATE 50 MG: 10 INJECTION, SOLUTION INTRAVENOUS at 12:52

## 2023-04-04 RX ADMIN — PROTAMINE SULFATE 250 MG: 10 INJECTION, SOLUTION INTRAVENOUS at 12:24

## 2023-04-04 RX ADMIN — SODIUM CHLORIDE 0.02 MCG/KG/MIN: 9 INJECTION, SOLUTION INTRAVENOUS at 09:43

## 2023-04-04 RX ADMIN — SODIUM CHLORIDE, PRESERVATIVE FREE 10 ML: 5 INJECTION INTRAVENOUS at 20:31

## 2023-04-04 RX ADMIN — POTASSIUM CHLORIDE 20 MEQ: 29.8 INJECTION, SOLUTION INTRAVENOUS at 15:11

## 2023-04-04 RX ADMIN — FENTANYL CITRATE 100 MCG: 50 INJECTION INTRAVENOUS at 12:36

## 2023-04-04 RX ADMIN — FENTANYL CITRATE 150 MCG: 50 INJECTION INTRAVENOUS at 13:29

## 2023-04-04 RX ADMIN — MIDAZOLAM 1 MG: 1 INJECTION INTRAMUSCULAR; INTRAVENOUS at 09:17

## 2023-04-04 RX ADMIN — ROCURONIUM BROMIDE 50 MG: 10 INJECTION, SOLUTION INTRAVENOUS at 09:25

## 2023-04-04 RX ADMIN — PROPOFOL 30 MCG/KG/MIN: 10 INJECTION, EMULSION INTRAVENOUS at 14:53

## 2023-04-04 RX ADMIN — VASOPRESSIN 0.03 UNITS/MIN: 20 INJECTION INTRAVENOUS at 12:29

## 2023-04-04 RX ADMIN — Medication 25 MCG: at 12:56

## 2023-04-04 RX ADMIN — MUPIROCIN: 20 OINTMENT TOPICAL at 15:40

## 2023-04-04 RX ADMIN — FENTANYL CITRATE 100 MCG: 50 INJECTION INTRAVENOUS at 13:19

## 2023-04-04 RX ADMIN — SODIUM CHLORIDE: 9 INJECTION, SOLUTION INTRAVENOUS at 04:08

## 2023-04-04 RX ADMIN — HEPARIN SODIUM 24000 UNITS: 1000 INJECTION INTRAVENOUS; SUBCUTANEOUS at 10:42

## 2023-04-04 RX ADMIN — Medication 1000 MG: at 10:02

## 2023-04-04 RX ADMIN — ROCURONIUM BROMIDE 20 MG: 10 INJECTION, SOLUTION INTRAVENOUS at 10:30

## 2023-04-04 RX ADMIN — LIDOCAINE HYDROCHLORIDE 40 MG: 10 SOLUTION INTRAVENOUS at 09:24

## 2023-04-04 RX ADMIN — VASOPRESSIN 0.05 UNITS/MIN: 20 INJECTION INTRAVENOUS at 12:11

## 2023-04-04 RX ADMIN — Medication 50 MCG: at 10:04

## 2023-04-04 RX ADMIN — PROPOFOL 25 MCG/KG/MIN: 10 INJECTION, EMULSION INTRAVENOUS at 12:44

## 2023-04-04 RX ADMIN — SODIUM BICARBONATE 50 MEQ: 84 INJECTION INTRAVENOUS at 20:47

## 2023-04-04 RX ADMIN — AMIODARONE HYDROCHLORIDE 200 MG: 200 TABLET ORAL at 08:39

## 2023-04-04 RX ADMIN — Medication 100 MCG: at 12:35

## 2023-04-04 RX ADMIN — SODIUM BICARBONATE 50 MEQ: 84 INJECTION, SOLUTION INTRAVENOUS at 20:47

## 2023-04-04 RX ADMIN — Medication 100 MCG: at 09:47

## 2023-04-04 RX ADMIN — Medication 50 MCG: at 10:14

## 2023-04-04 RX ADMIN — SODIUM CHLORIDE: 9 INJECTION, SOLUTION INTRAVENOUS at 09:45

## 2023-04-04 RX ADMIN — SODIUM CHLORIDE 0.04 MCG/KG/MIN: 9 INJECTION, SOLUTION INTRAVENOUS at 09:49

## 2023-04-04 RX ADMIN — Medication 2000 MG: at 10:25

## 2023-04-04 RX ADMIN — Medication 100 MCG: at 09:21

## 2023-04-04 RX ADMIN — MIDAZOLAM 1 MG: 1 INJECTION INTRAMUSCULAR; INTRAVENOUS at 11:56

## 2023-04-04 RX ADMIN — ALBUMIN (HUMAN) 25 G: 12.5 INJECTION, SOLUTION INTRAVENOUS at 21:53

## 2023-04-04 RX ADMIN — OXYCODONE HYDROCHLORIDE AND ACETAMINOPHEN 2 TABLET: 5; 325 TABLET ORAL at 20:30

## 2023-04-04 RX ADMIN — FENTANYL CITRATE 100 MCG: 50 INJECTION INTRAVENOUS at 12:41

## 2023-04-04 RX ADMIN — SODIUM BICARBONATE 50 MEQ: 84 INJECTION INTRAVENOUS at 20:48

## 2023-04-04 RX ADMIN — ROCURONIUM BROMIDE 20 MG: 10 INJECTION, SOLUTION INTRAVENOUS at 13:19

## 2023-04-04 RX ADMIN — OXYCODONE HYDROCHLORIDE AND ACETAMINOPHEN 2 TABLET: 5; 325 TABLET ORAL at 16:15

## 2023-04-04 RX ADMIN — MIDAZOLAM 2 MG: 1 INJECTION INTRAMUSCULAR; INTRAVENOUS at 12:31

## 2023-04-04 RX ADMIN — GLYCOPYRROLATE 0.2 MG: 0.2 INJECTION INTRAMUSCULAR; INTRAVENOUS at 10:05

## 2023-04-04 RX ADMIN — SODIUM BICARBONATE 50 MEQ: 84 INJECTION, SOLUTION INTRAVENOUS at 20:48

## 2023-04-04 RX ADMIN — Medication 50 MCG: at 10:23

## 2023-04-04 RX ADMIN — METOPROLOL TARTRATE 12.5 MG: 25 TABLET, FILM COATED ORAL at 08:35

## 2023-04-04 RX ADMIN — FENTANYL CITRATE 200 MCG: 50 INJECTION INTRAVENOUS at 09:24

## 2023-04-04 RX ADMIN — AMINOCAPROIC ACID 1 G/HR: 250 INJECTION, SOLUTION INTRAVENOUS at 10:01

## 2023-04-04 RX ADMIN — SODIUM BICARBONATE 50 MEQ: 84 INJECTION, SOLUTION INTRAVENOUS at 22:05

## 2023-04-04 ASSESSMENT — PAIN DESCRIPTION - LOCATION
LOCATION: CHEST
LOCATION: CHEST

## 2023-04-04 ASSESSMENT — PAIN DESCRIPTION - ONSET
ONSET: ON-GOING
ONSET: ON-GOING

## 2023-04-04 ASSESSMENT — PAIN SCALES - WONG BAKER
WONGBAKER_NUMERICALRESPONSE: 8
WONGBAKER_NUMERICALRESPONSE: 8

## 2023-04-04 ASSESSMENT — PAIN DESCRIPTION - PAIN TYPE
TYPE: SURGICAL PAIN
TYPE: SURGICAL PAIN

## 2023-04-04 ASSESSMENT — PAIN DESCRIPTION - FREQUENCY
FREQUENCY: CONTINUOUS
FREQUENCY: CONTINUOUS

## 2023-04-04 ASSESSMENT — PULMONARY FUNCTION TESTS
PIF_VALUE: 11
PIF_VALUE: 11

## 2023-04-04 ASSESSMENT — PAIN DESCRIPTION - DESCRIPTORS
DESCRIPTORS: ACHING;SORE
DESCRIPTORS: ACHING;SORE

## 2023-04-04 ASSESSMENT — PAIN - FUNCTIONAL ASSESSMENT
PAIN_FUNCTIONAL_ASSESSMENT: PREVENTS OR INTERFERES SOME ACTIVE ACTIVITIES AND ADLS
PAIN_FUNCTIONAL_ASSESSMENT: PREVENTS OR INTERFERES SOME ACTIVE ACTIVITIES AND ADLS

## 2023-04-04 ASSESSMENT — PAIN DESCRIPTION - ORIENTATION
ORIENTATION: MID
ORIENTATION: MID

## 2023-04-04 NOTE — ANESTHESIA PROCEDURE NOTES
Arterial Line:    An arterial line was placed using ultrasound guidance, in the OR for the following indication(s): Viviane Ruffing A 20 gauge (size), 1 and 1/4 inch (length), Arrow (type) catheter was placed, Seldinger technique used, into the left radial artery, secured by Tegaderm. Anesthesia type: Local  Local infiltration: Injection    Events:  patient tolerated procedure well with no complications. 4/4/2023 9:20 AM4/4/2023 9:23 AM  Anesthesiologist: Cheryl Renee MD  Resident/CRNA: SHERRI Amos - CRNA  Other anesthesia staff: Malena Syed RN  Performed: Anesthesiologist   Preanesthetic Checklist  Completed: patient identified, IV checked, site marked, risks and benefits discussed, surgical/procedural consents, equipment checked, pre-op evaluation, timeout performed, anesthesia consent given, oxygen available, monitors applied/VS acknowledged, fire risk safety assessment completed and verbalized and blood product R/B/A discussed and consented
Central Venous Line:    A central venous line was placed using surface landmarks, in the OR for the following indication(s): central venous access and CVP monitoring. Sterility preparation included the following: hand hygiene performed prior to procedure, maximum sterile barriers used and sterile technique used to drape from head to toe. The patient was placed in Trendelenburg position. The left subclavian vein was prepped. The site was prepped with Chloraprep. introducer SLIC was placed. During the procedure, the following specific steps were taken: target vein identified, needle advanced into vein and blood aspirated and guidewire advanced into vein. Intravenous verification was obtained by venous blood return, LOU and LOU. Post insertion care included: all ports aspirated, all ports flushed easily, guidewire removed intact, Biopatch applied, line sutured in place and dressing applied. During the procedure the patient experienced: patient tolerated procedure well with no complications and EBL < 5mL.       Outcomes: uncomplicated and patient tolerated procedure wellno  Anesthesia type: general..No  Staffing  Anesthesiologist: Porfirio Hodge MD  Preanesthetic Checklist  Completed: patient identified, timeout performed and monitors applied/VS acknowledged
not present. Device not present. Left atrial appendage normal.      Septa    Atrial Septum:  Intra-atrial septal morphology normal.      Ventricular Septum:  Intra-ventricular septum morphology normal.      Diastolic Function Measurements:  Diastolic Dysfunction Grade= I (Delayed relaxation)  E=  ms  A=  ms  E/A Ratio=   DT=  ms  S/D=   IVRT=    Other Findings  Pericardium:  normal  Pleural Effusion:  none  Pulmonary Arteries:  normal  Pulmonary Venous Flow:  normal    Anesthesia Information  Performed Personally  Anesthesiologist:  Nikky Al MD    Post Intervention Follow-up Study  Aortic Function: improved0. 29NoneComments: Peak pressure 15  Mean 7 no leak and opens and closes very well

## 2023-04-04 NOTE — ANESTHESIA POSTPROCEDURE EVALUATION
Department of Anesthesiology  Postprocedure Note    Patient: Shabnam Rosenberg  MRN: [id number]  YOB: 1955  Date of evaluation: 4/4/2023      Procedure Summary     Date: 04/04/23 Room / Location: [address]    Anesthesia Start: 0911 Anesthesia Stop: 1843    Procedure: AORTIC VALVE REPLACEMENT 21MM INSPIRIS WITH STERNALOCK 360, ON PUMP,  LOU (Chest) Diagnosis:       Aortic stenosis, severe      (SEVERE AORTIC STENOSIS)    Surgeons: Serge Veronica MD Responsible Provider: Lucian Gary MD    Anesthesia Type: general ASA Status: 4          Anesthesia Type: No value filed.     Fuentes Phase I:      Fuentes Phase II:        Anesthesia Post Evaluation    Patient location during evaluation: ICU  Patient participation: complete - patient cannot participate  Level of consciousness: sedated and ventilated  Pain score: 0  Airway patency: patent  Nausea & Vomiting: no vomiting and no nausea  Complications: no  Cardiovascular status: hemodynamically stable  Respiratory status: acceptable, intubated and ventilator  Hydration status: stable

## 2023-04-05 ENCOUNTER — APPOINTMENT (OUTPATIENT)
Dept: GENERAL RADIOLOGY | Age: 68
End: 2023-04-05
Payer: MEDICAID

## 2023-04-05 LAB
ABO/RH: NORMAL
ANION GAP SERPL CALCULATED.3IONS-SCNC: 7 MMOL/L (ref 9–17)
ANTIBODY SCREEN: NEGATIVE
ARM BAND NUMBER: NORMAL
BLD PROD TYP BPU: NORMAL
BLD PROD TYP BPU: NORMAL
BPU ID: NORMAL
BPU ID: NORMAL
BUN SERPL-MCNC: 12 MG/DL (ref 8–23)
CA-I BLD-SCNC: 1.08 MMOL/L (ref 1.13–1.33)
CA-I BLD-SCNC: 1.35 MMOL/L (ref 1.13–1.33)
CALCIUM SERPL-MCNC: 7.5 MG/DL (ref 8.6–10.4)
CHLORIDE SERPL-SCNC: 113 MMOL/L (ref 98–107)
CO2 SERPL-SCNC: 23 MMOL/L (ref 20–31)
CREAT SERPL-MCNC: 0.75 MG/DL (ref 0.5–0.9)
CROSSMATCH RESULT: NORMAL
CROSSMATCH RESULT: NORMAL
DISPENSE STATUS BLOOD BANK: NORMAL
DISPENSE STATUS BLOOD BANK: NORMAL
EKG ATRIAL RATE: 68 BPM
EKG ATRIAL RATE: 79 BPM
EKG P AXIS: 64 DEGREES
EKG P AXIS: 69 DEGREES
EKG P-R INTERVAL: 190 MS
EKG P-R INTERVAL: 200 MS
EKG Q-T INTERVAL: 468 MS
EKG Q-T INTERVAL: 552 MS
EKG QRS DURATION: 100 MS
EKG QRS DURATION: 94 MS
EKG QTC CALCULATION (BAZETT): 536 MS
EKG QTC CALCULATION (BAZETT): 586 MS
EKG R AXIS: 45 DEGREES
EKG R AXIS: 55 DEGREES
EKG T AXIS: 115 DEGREES
EKG T AXIS: 124 DEGREES
EKG VENTRICULAR RATE: 68 BPM
EKG VENTRICULAR RATE: 79 BPM
EST. AVERAGE GLUCOSE BLD GHB EST-MCNC: 114 MG/DL
EXPIRATION DATE: NORMAL
GFR SERPL CREATININE-BSD FRML MDRD: >60 ML/MIN/1.73M2
GLUCOSE BLD-MCNC: 114 MG/DL (ref 65–105)
GLUCOSE BLD-MCNC: 117 MG/DL (ref 65–105)
GLUCOSE BLD-MCNC: 136 MG/DL (ref 65–105)
GLUCOSE BLD-MCNC: 139 MG/DL (ref 65–105)
GLUCOSE BLD-MCNC: 99 MG/DL (ref 65–105)
GLUCOSE SERPL-MCNC: 144 MG/DL (ref 70–99)
HBA1C MFR BLD: 5.6 % (ref 4–6)
HCT VFR BLD AUTO: 26.7 % (ref 36.3–47.1)
HGB BLD-MCNC: 8.4 G/DL (ref 11.9–15.1)
INR PPP: 1.3
MAGNESIUM SERPL-MCNC: 2 MG/DL (ref 1.6–2.6)
MCH RBC QN AUTO: 29.4 PG (ref 25.2–33.5)
MCHC RBC AUTO-ENTMCNC: 31.5 G/DL (ref 28.4–34.8)
MCV RBC AUTO: 93.4 FL (ref 82.6–102.9)
NRBC AUTOMATED: 0 PER 100 WBC
PDW BLD-RTO: 13.3 % (ref 11.8–14.4)
PLATELET # BLD AUTO: 81 K/UL (ref 138–453)
PMV BLD AUTO: 10.4 FL (ref 8.1–13.5)
POTASSIUM SERPL-SCNC: 4.3 MMOL/L (ref 3.7–5.3)
PROTHROMBIN TIME: 16 SEC (ref 11.7–14.9)
RBC # BLD: 2.86 M/UL (ref 3.95–5.11)
SODIUM SERPL-SCNC: 143 MMOL/L (ref 135–144)
TRANSFUSION STATUS: NORMAL
TRANSFUSION STATUS: NORMAL
UNIT DIVISION: 0
UNIT DIVISION: 0
WBC # BLD AUTO: 7.8 K/UL (ref 3.5–11.3)

## 2023-04-05 PROCEDURE — 97166 OT EVAL MOD COMPLEX 45 MIN: CPT

## 2023-04-05 PROCEDURE — 97162 PT EVAL MOD COMPLEX 30 MIN: CPT

## 2023-04-05 PROCEDURE — 2580000003 HC RX 258: Performed by: NURSE PRACTITIONER

## 2023-04-05 PROCEDURE — 83735 ASSAY OF MAGNESIUM: CPT

## 2023-04-05 PROCEDURE — 99024 POSTOP FOLLOW-UP VISIT: CPT | Performed by: PHYSICIAN ASSISTANT

## 2023-04-05 PROCEDURE — 2100000001 HC CVICU R&B

## 2023-04-05 PROCEDURE — 85610 PROTHROMBIN TIME: CPT

## 2023-04-05 PROCEDURE — 93005 ELECTROCARDIOGRAM TRACING: CPT | Performed by: NURSE PRACTITIONER

## 2023-04-05 PROCEDURE — 97530 THERAPEUTIC ACTIVITIES: CPT

## 2023-04-05 PROCEDURE — 36415 COLL VENOUS BLD VENIPUNCTURE: CPT

## 2023-04-05 PROCEDURE — 82947 ASSAY GLUCOSE BLOOD QUANT: CPT

## 2023-04-05 PROCEDURE — 6370000000 HC RX 637 (ALT 250 FOR IP): Performed by: NURSE PRACTITIONER

## 2023-04-05 PROCEDURE — 6370000000 HC RX 637 (ALT 250 FOR IP)

## 2023-04-05 PROCEDURE — 6370000000 HC RX 637 (ALT 250 FOR IP): Performed by: PHYSICIAN ASSISTANT

## 2023-04-05 PROCEDURE — 82330 ASSAY OF CALCIUM: CPT

## 2023-04-05 PROCEDURE — 2500000003 HC RX 250 WO HCPCS: Performed by: NURSE PRACTITIONER

## 2023-04-05 PROCEDURE — 6360000002 HC RX W HCPCS: Performed by: NURSE PRACTITIONER

## 2023-04-05 PROCEDURE — 80048 BASIC METABOLIC PNL TOTAL CA: CPT

## 2023-04-05 PROCEDURE — 85027 COMPLETE CBC AUTOMATED: CPT

## 2023-04-05 PROCEDURE — 71045 X-RAY EXAM CHEST 1 VIEW: CPT

## 2023-04-05 RX ORDER — FUROSEMIDE 40 MG/1
20 TABLET ORAL DAILY
Status: DISCONTINUED | OUTPATIENT
Start: 2023-04-06 | End: 2023-04-06

## 2023-04-05 RX ORDER — WARFARIN SODIUM 5 MG/1
5 TABLET ORAL
Status: DISCONTINUED | OUTPATIENT
Start: 2023-04-05 | End: 2023-04-05

## 2023-04-05 RX ORDER — SPIRONOLACTONE 25 MG/1
12.5 TABLET ORAL DAILY
Status: DISCONTINUED | OUTPATIENT
Start: 2023-04-06 | End: 2023-04-08 | Stop reason: HOSPADM

## 2023-04-05 RX ORDER — ACETAMINOPHEN 325 MG/1
650 TABLET ORAL EVERY 4 HOURS PRN
Status: DISCONTINUED | OUTPATIENT
Start: 2023-04-05 | End: 2023-04-08 | Stop reason: HOSPADM

## 2023-04-05 RX ORDER — WARFARIN SODIUM 5 MG/1
5 TABLET ORAL
Status: COMPLETED | OUTPATIENT
Start: 2023-04-05 | End: 2023-04-05

## 2023-04-05 RX ADMIN — METOPROLOL TARTRATE 12.5 MG: 25 TABLET, FILM COATED ORAL at 09:06

## 2023-04-05 RX ADMIN — Medication 2000 MG: at 09:08

## 2023-04-05 RX ADMIN — POLYETHYLENE GLYCOL 3350 17 G: 17 POWDER, FOR SOLUTION ORAL at 09:06

## 2023-04-05 RX ADMIN — HYDRALAZINE HYDROCHLORIDE 5 MG: 20 INJECTION INTRAMUSCULAR; INTRAVENOUS at 06:56

## 2023-04-05 RX ADMIN — SENNOSIDES AND DOCUSATE SODIUM 1 TABLET: 50; 8.6 TABLET ORAL at 20:31

## 2023-04-05 RX ADMIN — Medication 1000 MG: at 09:21

## 2023-04-05 RX ADMIN — ASPIRIN 81 MG: 81 TABLET, COATED ORAL at 09:06

## 2023-04-05 RX ADMIN — AMIODARONE HYDROCHLORIDE 200 MG: 200 TABLET ORAL at 13:23

## 2023-04-05 RX ADMIN — ACETAMINOPHEN 650 MG: 325 TABLET ORAL at 20:31

## 2023-04-05 RX ADMIN — DESMOPRESSIN ACETATE 20 MG: 0.2 TABLET ORAL at 20:31

## 2023-04-05 RX ADMIN — AMIODARONE HYDROCHLORIDE 200 MG: 200 TABLET ORAL at 09:06

## 2023-04-05 RX ADMIN — INSULIN GLARGINE 10 UNITS: 100 INJECTION, SOLUTION SUBCUTANEOUS at 20:49

## 2023-04-05 RX ADMIN — HYDRALAZINE HYDROCHLORIDE 5 MG: 20 INJECTION INTRAMUSCULAR; INTRAVENOUS at 05:26

## 2023-04-05 RX ADMIN — PANTOPRAZOLE SODIUM 40 MG: 40 TABLET, DELAYED RELEASE ORAL at 09:06

## 2023-04-05 RX ADMIN — ACETAMINOPHEN 650 MG: 325 TABLET ORAL at 13:24

## 2023-04-05 RX ADMIN — SODIUM CHLORIDE, PRESERVATIVE FREE 10 ML: 5 INJECTION INTRAVENOUS at 09:07

## 2023-04-05 RX ADMIN — HYDRALAZINE HYDROCHLORIDE 5 MG: 20 INJECTION INTRAMUSCULAR; INTRAVENOUS at 17:16

## 2023-04-05 RX ADMIN — WARFARIN SODIUM 5 MG: 5 TABLET ORAL at 17:16

## 2023-04-05 RX ADMIN — SENNOSIDES AND DOCUSATE SODIUM 1 TABLET: 50; 8.6 TABLET ORAL at 09:06

## 2023-04-05 RX ADMIN — SODIUM CHLORIDE, PRESERVATIVE FREE 10 ML: 5 INJECTION INTRAVENOUS at 20:43

## 2023-04-05 RX ADMIN — Medication 2000 MG: at 03:52

## 2023-04-05 RX ADMIN — HYDRALAZINE HYDROCHLORIDE 5 MG: 20 INJECTION INTRAMUSCULAR; INTRAVENOUS at 05:56

## 2023-04-05 RX ADMIN — MUPIROCIN: 20 OINTMENT TOPICAL at 20:43

## 2023-04-05 RX ADMIN — MUPIROCIN: 20 OINTMENT TOPICAL at 09:08

## 2023-04-05 RX ADMIN — CALCIUM CHLORIDE 1000 MG: 100 INJECTION, SOLUTION INTRAVENOUS at 14:25

## 2023-04-05 RX ADMIN — Medication 1000 MG: at 22:42

## 2023-04-05 RX ADMIN — Medication 2000 MG: at 17:18

## 2023-04-05 RX ADMIN — METOPROLOL TARTRATE 25 MG: 25 TABLET, FILM COATED ORAL at 20:31

## 2023-04-05 ASSESSMENT — PAIN DESCRIPTION - ONSET
ONSET: ON-GOING
ONSET: ON-GOING

## 2023-04-05 ASSESSMENT — PAIN SCALES - WONG BAKER
WONGBAKER_NUMERICALRESPONSE: 4

## 2023-04-05 ASSESSMENT — PAIN DESCRIPTION - PAIN TYPE
TYPE: SURGICAL PAIN
TYPE: SURGICAL PAIN

## 2023-04-05 ASSESSMENT — PAIN DESCRIPTION - DESCRIPTORS
DESCRIPTORS: ACHING;SORE
DESCRIPTORS: ACHING;SORE

## 2023-04-05 ASSESSMENT — PAIN DESCRIPTION - LOCATION
LOCATION: CHEST
LOCATION: CHEST

## 2023-04-05 ASSESSMENT — PAIN - FUNCTIONAL ASSESSMENT
PAIN_FUNCTIONAL_ASSESSMENT: ACTIVITIES ARE NOT PREVENTED
PAIN_FUNCTIONAL_ASSESSMENT: ACTIVITIES ARE NOT PREVENTED

## 2023-04-05 ASSESSMENT — PAIN DESCRIPTION - FREQUENCY
FREQUENCY: CONTINUOUS
FREQUENCY: CONTINUOUS

## 2023-04-05 ASSESSMENT — PAIN DESCRIPTION - ORIENTATION
ORIENTATION: MID
ORIENTATION: MID

## 2023-04-05 ASSESSMENT — PAIN SCALES - GENERAL
PAINLEVEL_OUTOF10: 4
PAINLEVEL_OUTOF10: 4

## 2023-04-05 NOTE — OP NOTE
cross-clamp was applied and a dose of cold [place]ido  solution was given to achieve an adequate diastolic arrest.  The usual  hockey stick type aortotomy was performed and extended, left laterally  and into the noncoronary sinus. Right after that, there was extreme  calcification of the aortic valve, which made the aortotomy difficult,  and I performed some endarterectomy at this point. The valve inspected  as above, it was extremely difficult to manipulate, incise, and debride. Once this was all done, I measured for between 19 and 21-mm valve and I  placed the valve sutures. I used the technique as described above to  make sure that a 21-mm fit. This resulted in no perivalvular leak and  no residual stenosis. The aortotomy was ultimately able to be closed  after the endarterectomy with my double running Prolene technique. The  heart was de-aired, the cross-clamp was removed, and there was the  eventual return of normal sinus rhythm. A ventricular wire was placed. Ultimately, I weaned her from cardiopulmonary bypass with good  hemodynamics on minimal inotropic support. Satisfied with this, I thus  administered protamine and decannulated. Hemostasis was achieved and  verified and chest tubes were placed. Ultimately, I closed the chest  with my SternaLock 360 sternal closure system, as her bone was  osteoporotic and frail. The remainder of the incisions were closed in  the usual layered fashion. Ultimately, the patient was transported to  the CVICU in stable condition. There were no adequately trained or available residents for assistance  in this operation and the presence of Alfredo Grigsby was critical for the  first assistance necessary to complete the surgery. OMER Rueda MD    D: 04/05/2023 10:58:48       T: 04/05/2023 12:33:15     [email or web address]  Job#: [id number]     Doc#: [id number]    CC:

## 2023-04-06 ENCOUNTER — APPOINTMENT (OUTPATIENT)
Dept: GENERAL RADIOLOGY | Age: 68
End: 2023-04-06
Payer: MEDICAID

## 2023-04-06 PROBLEM — Z95.2 S/P AVR: Status: ACTIVE | Noted: 2023-04-06

## 2023-04-06 LAB
ANION GAP SERPL CALCULATED.3IONS-SCNC: 12 MMOL/L (ref 9–17)
BUN SERPL-MCNC: 13 MG/DL (ref 8–23)
CA-I BLD-SCNC: 1.09 MMOL/L (ref 1.13–1.33)
CALCIUM SERPL-MCNC: 8.8 MG/DL (ref 8.6–10.4)
CHLORIDE SERPL-SCNC: 111 MMOL/L (ref 98–107)
CO2 SERPL-SCNC: 18 MMOL/L (ref 20–31)
CREAT SERPL-MCNC: 0.6 MG/DL (ref 0.5–0.9)
GFR SERPL CREATININE-BSD FRML MDRD: >60 ML/MIN/1.73M2
GLUCOSE BLD-MCNC: 108 MG/DL (ref 65–105)
GLUCOSE BLD-MCNC: 114 MG/DL (ref 65–105)
GLUCOSE BLD-MCNC: 150 MG/DL (ref 65–105)
GLUCOSE BLD-MCNC: 155 MG/DL (ref 65–105)
GLUCOSE SERPL-MCNC: 119 MG/DL (ref 70–99)
HCT VFR BLD AUTO: 27.7 % (ref 36.3–47.1)
HGB BLD-MCNC: 9 G/DL (ref 11.9–15.1)
INR PPP: 1.5
MAGNESIUM SERPL-MCNC: 2.1 MG/DL (ref 1.6–2.6)
MCH RBC QN AUTO: 29.3 PG (ref 25.2–33.5)
MCHC RBC AUTO-ENTMCNC: 32.5 G/DL (ref 28.4–34.8)
MCV RBC AUTO: 90.2 FL (ref 82.6–102.9)
NRBC AUTOMATED: 0 PER 100 WBC
PDW BLD-RTO: 13.8 % (ref 11.8–14.4)
PLATELET # BLD AUTO: ABNORMAL K/UL (ref 138–453)
PLATELET, FLUORESCENCE: 92 K/UL (ref 138–453)
PLATELET, IMMATURE FRACTION: 3.9 % (ref 1.1–10.3)
POTASSIUM SERPL-SCNC: 4.8 MMOL/L (ref 3.7–5.3)
PROTHROMBIN TIME: 18 SEC (ref 11.7–14.9)
RBC # BLD: 3.07 M/UL (ref 3.95–5.11)
SODIUM SERPL-SCNC: 141 MMOL/L (ref 135–144)
SURGICAL PATHOLOGY REPORT: NORMAL
WBC # BLD AUTO: 11 K/UL (ref 3.5–11.3)

## 2023-04-06 PROCEDURE — 85027 COMPLETE CBC AUTOMATED: CPT

## 2023-04-06 PROCEDURE — 36415 COLL VENOUS BLD VENIPUNCTURE: CPT

## 2023-04-06 PROCEDURE — 97116 GAIT TRAINING THERAPY: CPT

## 2023-04-06 PROCEDURE — 85610 PROTHROMBIN TIME: CPT

## 2023-04-06 PROCEDURE — 2100000001 HC CVICU R&B

## 2023-04-06 PROCEDURE — 2580000003 HC RX 258: Performed by: NURSE PRACTITIONER

## 2023-04-06 PROCEDURE — 71045 X-RAY EXAM CHEST 1 VIEW: CPT

## 2023-04-06 PROCEDURE — 82947 ASSAY GLUCOSE BLOOD QUANT: CPT

## 2023-04-06 PROCEDURE — 6370000000 HC RX 637 (ALT 250 FOR IP): Performed by: NURSE PRACTITIONER

## 2023-04-06 PROCEDURE — 85055 RETICULATED PLATELET ASSAY: CPT

## 2023-04-06 PROCEDURE — 6360000002 HC RX W HCPCS: Performed by: NURSE PRACTITIONER

## 2023-04-06 PROCEDURE — 82330 ASSAY OF CALCIUM: CPT

## 2023-04-06 PROCEDURE — 83735 ASSAY OF MAGNESIUM: CPT

## 2023-04-06 PROCEDURE — 99024 POSTOP FOLLOW-UP VISIT: CPT | Performed by: NURSE PRACTITIONER

## 2023-04-06 PROCEDURE — 80048 BASIC METABOLIC PNL TOTAL CA: CPT

## 2023-04-06 PROCEDURE — 6370000000 HC RX 637 (ALT 250 FOR IP): Performed by: PHYSICIAN ASSISTANT

## 2023-04-06 PROCEDURE — 6370000000 HC RX 637 (ALT 250 FOR IP)

## 2023-04-06 RX ORDER — FENTANYL CITRATE 50 UG/ML
25 INJECTION, SOLUTION INTRAMUSCULAR; INTRAVENOUS EVERY 6 HOURS PRN
Status: DISCONTINUED | OUTPATIENT
Start: 2023-04-06 | End: 2023-04-08 | Stop reason: HOSPADM

## 2023-04-06 RX ORDER — LOSARTAN POTASSIUM 25 MG/1
25 TABLET ORAL DAILY
Status: DISCONTINUED | OUTPATIENT
Start: 2023-04-06 | End: 2023-04-08 | Stop reason: HOSPADM

## 2023-04-06 RX ORDER — WARFARIN SODIUM 2.5 MG/1
2.5 TABLET ORAL
Status: COMPLETED | OUTPATIENT
Start: 2023-04-06 | End: 2023-04-06

## 2023-04-06 RX ORDER — FUROSEMIDE 10 MG/ML
20 INJECTION INTRAMUSCULAR; INTRAVENOUS 2 TIMES DAILY
Status: DISCONTINUED | OUTPATIENT
Start: 2023-04-06 | End: 2023-04-08 | Stop reason: HOSPADM

## 2023-04-06 RX ADMIN — METOPROLOL TARTRATE 25 MG: 25 TABLET, FILM COATED ORAL at 20:13

## 2023-04-06 RX ADMIN — SODIUM CHLORIDE, PRESERVATIVE FREE 10 ML: 5 INJECTION INTRAVENOUS at 20:15

## 2023-04-06 RX ADMIN — SENNOSIDES AND DOCUSATE SODIUM 1 TABLET: 50; 8.6 TABLET ORAL at 09:09

## 2023-04-06 RX ADMIN — AMIODARONE HYDROCHLORIDE 200 MG: 200 TABLET ORAL at 09:09

## 2023-04-06 RX ADMIN — ASPIRIN 81 MG: 81 TABLET, COATED ORAL at 09:09

## 2023-04-06 RX ADMIN — INSULIN LISPRO 1 UNITS: 100 INJECTION, SOLUTION INTRAVENOUS; SUBCUTANEOUS at 20:17

## 2023-04-06 RX ADMIN — LOSARTAN POTASSIUM 25 MG: 25 TABLET, FILM COATED ORAL at 11:52

## 2023-04-06 RX ADMIN — DESMOPRESSIN ACETATE 20 MG: 0.2 TABLET ORAL at 20:13

## 2023-04-06 RX ADMIN — FUROSEMIDE 20 MG: 10 INJECTION, SOLUTION INTRAMUSCULAR; INTRAVENOUS at 17:59

## 2023-04-06 RX ADMIN — SPIRONOLACTONE 12.5 MG: 25 TABLET ORAL at 09:09

## 2023-04-06 RX ADMIN — MUPIROCIN: 20 OINTMENT TOPICAL at 20:15

## 2023-04-06 RX ADMIN — WARFARIN SODIUM 2.5 MG: 5 TABLET ORAL at 17:59

## 2023-04-06 RX ADMIN — POLYETHYLENE GLYCOL 3350 17 G: 17 POWDER, FOR SOLUTION ORAL at 09:09

## 2023-04-06 RX ADMIN — SENNOSIDES AND DOCUSATE SODIUM 1 TABLET: 50; 8.6 TABLET ORAL at 20:13

## 2023-04-06 RX ADMIN — MUPIROCIN: 20 OINTMENT TOPICAL at 09:19

## 2023-04-06 RX ADMIN — FUROSEMIDE 20 MG: 10 INJECTION, SOLUTION INTRAMUSCULAR; INTRAVENOUS at 11:52

## 2023-04-06 RX ADMIN — Medication 2000 MG: at 03:10

## 2023-04-06 RX ADMIN — AMIODARONE HYDROCHLORIDE 200 MG: 200 TABLET ORAL at 17:59

## 2023-04-06 RX ADMIN — AMIODARONE HYDROCHLORIDE 200 MG: 200 TABLET ORAL at 20:13

## 2023-04-06 RX ADMIN — INSULIN LISPRO 1 UNITS: 100 INJECTION, SOLUTION INTRAVENOUS; SUBCUTANEOUS at 12:09

## 2023-04-06 RX ADMIN — FUROSEMIDE 20 MG: 40 TABLET ORAL at 09:09

## 2023-04-06 RX ADMIN — OXYCODONE HYDROCHLORIDE AND ACETAMINOPHEN 1 TABLET: 5; 325 TABLET ORAL at 06:17

## 2023-04-06 RX ADMIN — SODIUM CHLORIDE, PRESERVATIVE FREE 10 ML: 5 INJECTION INTRAVENOUS at 09:10

## 2023-04-06 RX ADMIN — PANTOPRAZOLE SODIUM 40 MG: 40 TABLET, DELAYED RELEASE ORAL at 09:09

## 2023-04-06 RX ADMIN — METOPROLOL TARTRATE 25 MG: 25 TABLET, FILM COATED ORAL at 09:09

## 2023-04-06 ASSESSMENT — PAIN SCALES - WONG BAKER
WONGBAKER_NUMERICALRESPONSE: 4

## 2023-04-06 ASSESSMENT — PAIN DESCRIPTION - ONSET
ONSET: ON-GOING
ONSET: ON-GOING

## 2023-04-06 ASSESSMENT — PAIN DESCRIPTION - PAIN TYPE
TYPE: SURGICAL PAIN
TYPE: SURGICAL PAIN

## 2023-04-06 ASSESSMENT — PAIN DESCRIPTION - ORIENTATION
ORIENTATION: LEFT;MID
ORIENTATION: MID
ORIENTATION: MID

## 2023-04-06 ASSESSMENT — PAIN SCALES - GENERAL
PAINLEVEL_OUTOF10: 2
PAINLEVEL_OUTOF10: 6
PAINLEVEL_OUTOF10: 2

## 2023-04-06 ASSESSMENT — PAIN DESCRIPTION - LOCATION
LOCATION: CHEST;SHOULDER
LOCATION: CHEST
LOCATION: CHEST

## 2023-04-06 ASSESSMENT — PAIN DESCRIPTION - DESCRIPTORS
DESCRIPTORS: ACHING;SORE

## 2023-04-06 ASSESSMENT — PAIN DESCRIPTION - FREQUENCY
FREQUENCY: CONTINUOUS
FREQUENCY: CONTINUOUS

## 2023-04-07 ENCOUNTER — APPOINTMENT (OUTPATIENT)
Dept: GENERAL RADIOLOGY | Age: 68
End: 2023-04-07
Payer: MEDICAID

## 2023-04-07 LAB
ANION GAP SERPL CALCULATED.3IONS-SCNC: 9 MMOL/L (ref 9–17)
BUN SERPL-MCNC: 18 MG/DL (ref 8–23)
CA-I BLD-SCNC: 1.2 MMOL/L (ref 1.13–1.33)
CALCIUM SERPL-MCNC: 8.5 MG/DL (ref 8.6–10.4)
CHLORIDE SERPL-SCNC: 107 MMOL/L (ref 98–107)
CO2 SERPL-SCNC: 24 MMOL/L (ref 20–31)
CREAT SERPL-MCNC: 0.61 MG/DL (ref 0.5–0.9)
GFR SERPL CREATININE-BSD FRML MDRD: >60 ML/MIN/1.73M2
GLUCOSE BLD-MCNC: 104 MG/DL (ref 65–105)
GLUCOSE BLD-MCNC: 114 MG/DL (ref 65–105)
GLUCOSE BLD-MCNC: 94 MG/DL (ref 65–105)
GLUCOSE SERPL-MCNC: 99 MG/DL (ref 70–99)
HCT VFR BLD AUTO: 29.5 % (ref 36.3–47.1)
HGB BLD-MCNC: 9.6 G/DL (ref 11.9–15.1)
INR PPP: 3.1
MAGNESIUM SERPL-MCNC: 1.8 MG/DL (ref 1.6–2.6)
MCH RBC QN AUTO: 29.3 PG (ref 25.2–33.5)
MCHC RBC AUTO-ENTMCNC: 32.5 G/DL (ref 28.4–34.8)
MCV RBC AUTO: 89.9 FL (ref 82.6–102.9)
NRBC AUTOMATED: 0 PER 100 WBC
PDW BLD-RTO: 13.4 % (ref 11.8–14.4)
PLATELET # BLD AUTO: 83 K/UL (ref 138–453)
PMV BLD AUTO: 11.7 FL (ref 8.1–13.5)
POTASSIUM SERPL-SCNC: 3.9 MMOL/L (ref 3.7–5.3)
PROTHROMBIN TIME: 31.8 SEC (ref 11.7–14.9)
RBC # BLD: 3.28 M/UL (ref 3.95–5.11)
SODIUM SERPL-SCNC: 140 MMOL/L (ref 135–144)
WBC # BLD AUTO: 11.7 K/UL (ref 3.5–11.3)

## 2023-04-07 PROCEDURE — 80048 BASIC METABOLIC PNL TOTAL CA: CPT

## 2023-04-07 PROCEDURE — 97116 GAIT TRAINING THERAPY: CPT

## 2023-04-07 PROCEDURE — 36415 COLL VENOUS BLD VENIPUNCTURE: CPT

## 2023-04-07 PROCEDURE — 82330 ASSAY OF CALCIUM: CPT

## 2023-04-07 PROCEDURE — 6360000002 HC RX W HCPCS: Performed by: NURSE PRACTITIONER

## 2023-04-07 PROCEDURE — 6370000000 HC RX 637 (ALT 250 FOR IP): Performed by: NURSE PRACTITIONER

## 2023-04-07 PROCEDURE — 6370000000 HC RX 637 (ALT 250 FOR IP): Performed by: PHYSICIAN ASSISTANT

## 2023-04-07 PROCEDURE — 71045 X-RAY EXAM CHEST 1 VIEW: CPT

## 2023-04-07 PROCEDURE — 99024 POSTOP FOLLOW-UP VISIT: CPT | Performed by: NURSE PRACTITIONER

## 2023-04-07 PROCEDURE — 83735 ASSAY OF MAGNESIUM: CPT

## 2023-04-07 PROCEDURE — 6370000000 HC RX 637 (ALT 250 FOR IP)

## 2023-04-07 PROCEDURE — 85610 PROTHROMBIN TIME: CPT

## 2023-04-07 PROCEDURE — 97110 THERAPEUTIC EXERCISES: CPT

## 2023-04-07 PROCEDURE — 97530 THERAPEUTIC ACTIVITIES: CPT

## 2023-04-07 PROCEDURE — 2580000003 HC RX 258: Performed by: NURSE PRACTITIONER

## 2023-04-07 PROCEDURE — 82947 ASSAY GLUCOSE BLOOD QUANT: CPT

## 2023-04-07 PROCEDURE — 85027 COMPLETE CBC AUTOMATED: CPT

## 2023-04-07 PROCEDURE — 2140000001 HC CVICU INTERMEDIATE R&B

## 2023-04-07 RX ADMIN — SPIRONOLACTONE 12.5 MG: 25 TABLET ORAL at 14:19

## 2023-04-07 RX ADMIN — ASPIRIN 81 MG: 81 TABLET, COATED ORAL at 08:51

## 2023-04-07 RX ADMIN — SENNOSIDES AND DOCUSATE SODIUM 1 TABLET: 50; 8.6 TABLET ORAL at 21:05

## 2023-04-07 RX ADMIN — SENNOSIDES AND DOCUSATE SODIUM 1 TABLET: 50; 8.6 TABLET ORAL at 12:36

## 2023-04-07 RX ADMIN — SODIUM CHLORIDE, PRESERVATIVE FREE 10 ML: 5 INJECTION INTRAVENOUS at 08:52

## 2023-04-07 RX ADMIN — PANTOPRAZOLE SODIUM 40 MG: 40 TABLET, DELAYED RELEASE ORAL at 08:51

## 2023-04-07 RX ADMIN — AMIODARONE HYDROCHLORIDE 200 MG: 200 TABLET ORAL at 08:51

## 2023-04-07 RX ADMIN — OXYCODONE HYDROCHLORIDE AND ACETAMINOPHEN 1 TABLET: 5; 325 TABLET ORAL at 14:19

## 2023-04-07 RX ADMIN — METOPROLOL TARTRATE 25 MG: 25 TABLET, FILM COATED ORAL at 21:04

## 2023-04-07 RX ADMIN — MUPIROCIN: 20 OINTMENT TOPICAL at 21:00

## 2023-04-07 RX ADMIN — AMIODARONE HYDROCHLORIDE 200 MG: 200 TABLET ORAL at 21:04

## 2023-04-07 RX ADMIN — ACETAMINOPHEN 650 MG: 325 TABLET ORAL at 08:52

## 2023-04-07 RX ADMIN — AMIODARONE HYDROCHLORIDE 200 MG: 200 TABLET ORAL at 14:20

## 2023-04-07 RX ADMIN — DESMOPRESSIN ACETATE 20 MG: 0.2 TABLET ORAL at 21:05

## 2023-04-07 RX ADMIN — FUROSEMIDE 20 MG: 10 INJECTION, SOLUTION INTRAMUSCULAR; INTRAVENOUS at 17:28

## 2023-04-07 RX ADMIN — FUROSEMIDE 20 MG: 10 INJECTION, SOLUTION INTRAMUSCULAR; INTRAVENOUS at 08:51

## 2023-04-07 RX ADMIN — MUPIROCIN: 20 OINTMENT TOPICAL at 13:42

## 2023-04-07 RX ADMIN — LOSARTAN POTASSIUM 25 MG: 25 TABLET, FILM COATED ORAL at 14:20

## 2023-04-07 RX ADMIN — METOPROLOL TARTRATE 25 MG: 25 TABLET, FILM COATED ORAL at 08:51

## 2023-04-07 RX ADMIN — POLYETHYLENE GLYCOL 3350 17 G: 17 POWDER, FOR SOLUTION ORAL at 08:50

## 2023-04-07 ASSESSMENT — PAIN DESCRIPTION - ORIENTATION
ORIENTATION: MID
ORIENTATION: MID;UPPER

## 2023-04-07 ASSESSMENT — PAIN DESCRIPTION - DESCRIPTORS
DESCRIPTORS: ACHING
DESCRIPTORS: ACHING

## 2023-04-07 ASSESSMENT — PAIN DESCRIPTION - LOCATION
LOCATION: CHEST
LOCATION: GENERALIZED;CHEST

## 2023-04-07 ASSESSMENT — PAIN SCALES - GENERAL
PAINLEVEL_OUTOF10: 6
PAINLEVEL_OUTOF10: 5

## 2023-04-07 NOTE — CONSULTS
Attestation signed by      Attending Physician Statement:    I have discussed the care of  Zunilda Alan , including pertinent history and exam findings, with the Cardiology fellow/resident. I have seen and examined the patient and the key elements of all parts of the encounter have been performed by me. I agree with the assessment, plan and orders as documented by the fellow/resident, after I modified exam findings and plan of treatments, and the final version is my approved version of the assessment. Additional Comments: The patient was seen and examined, agree with below. Chest pain, SOB, NSTEMI. Acute CHF. Ejection systolic murmur. Possible Aortic stenosis. Will continue IV heparin, Echo. IV lasix. Plan for coronary angiography next wk after Echo is done. [facility]    Consult / H&P               Today's Date: 3/25/2023  Patient Name: Justina Banks  Date of admission: 3/24/2023  2:43 PM  Patient's age: 78 y.o., 1955  Ad[facility] Dx: [facility] [I21.4]    Reason for Consult:  Cardiac evaluation    Requesting Physician: Shelton Christie MD    CHIEF COMPLAINT:  Chest pain, SOB    History Obtained From:  patient's family, electronic medical record    HISTORY OF PRESENT ILLNESS:      The patient is a 78 y.o.  female who is admitted to the hospital for chest pain and shortness of breath. The patient has prior cardiac history of aortic stenosis, was being evaluated for surgery, was lost to follow up and came to the [facility] for visiting. However, she started having worsening shortness of breath on exertion and at rest with inability to lay flat. She also had left sided chest pain, pressure like, non radiating. History is limited due to language barrier. Troponin 22-23, BNP 3234. EKG revealed NSR, LVH with ST and T wave changes. Cardiology was consulted for further recommendations.      Past Medical History:   has no past medical history
No scheduled date at this time. We will also call structural heart to evaluate for potential TAVR. Please complete preop studies.   We will continue to follow along      SHERRI Esparza NP, CNP  Phone: [phone]
Assistance: Independent  Homemaking Responsibilities: Yes  Meal Prep Responsibility: Primary  Laundry Responsibility: Secondary  Cleaning Responsibility: Primary  Shopping Responsibility: Secondary  Ambulation Assistance: Independent  Transfer Assistance: Independent  Active : No  Patient's  Info: Children  Occupation: Retired  Leisure & Hobbies: Relax  Additional Comments: Reports having  Social History     Socioeconomic History    Marital status:      Spouse name: None    Number of children: None    Years of education: None    Highest education level: None   Tobacco Use    Smoking status: Never    Smokeless tobacco: Never   Substance and Sexual Activity    Alcohol use: Never    Drug use: Never       Family History:   History reviewed. No pertinent family history. Diagnostics:    CBC:   Recent Labs     04/05/23 0407 04/06/23 0218 04/07/23  0623   WBC 7.8 11.0 11.7*   RBC 2.86* 3.07* 3.28*   HGB 8.4* 9.0* 9.6*   HCT 26.7* 27.7* 29.5*   MCV 93.4 90.2 89.9   RDW 13.3 13.8 13.4   PLT 81* See Reflexed IPF Result 83*     BMP:    Recent Labs     04/05/23 0407 04/06/23 0218 04/07/23  0623   GLUCOSE 144* 119* 99   BUN 12 13 18   CREATININE 0.75 0.60 0.61   CALCIUM 7.5* 8.8 8.5*    141 140   K 4.3 4.8 3.9   * 111* 107   CO2 23 18* 24   ANIONGAP 7* 12 9   LABGLOM >60 >60 >60     HbA1c:   Lab Results   Component Value Date    LABA1C 5.6 04/03/2023     BNP: No results for input(s): BNP in the last 72 hours. PT/INR:   Recent Labs     04/05/23 0407 04/06/23 0218 04/07/23  0623   PROTIME 16.0* 18.0* 31.8*   INR 1.3 1.5 3.1     APTT:   Recent Labs     04/04/23  1357   APTT 50.9*     CARDIAC ENZYMES: No results for input(s): CKMB, CKMBINDEX, TROPONINT, TROPHS, TROPII in the last 72 hours. Invalid input(s): CKTOTAL;3   FASTING LIPID PANEL:No results found for: CHOL, HDL, TRIG  LIVER PROFILE: No results for input(s): AST, ALT, ALB, BILIDIR, BILITOT, ALKPHOS in the last 72 hours.

## 2023-04-08 ENCOUNTER — APPOINTMENT (OUTPATIENT)
Dept: GENERAL RADIOLOGY | Age: 68
End: 2023-04-08
Payer: MEDICAID

## 2023-04-08 VITALS
DIASTOLIC BLOOD PRESSURE: 72 MMHG | HEIGHT: 63 IN | RESPIRATION RATE: 18 BRPM | WEIGHT: 166.45 LBS | SYSTOLIC BLOOD PRESSURE: 123 MMHG | BODY MASS INDEX: 29.49 KG/M2 | TEMPERATURE: 98 F | OXYGEN SATURATION: 96 % | HEART RATE: 66 BPM

## 2023-04-08 LAB
ANION GAP SERPL CALCULATED.3IONS-SCNC: 8 MMOL/L (ref 9–17)
BUN SERPL-MCNC: 19 MG/DL (ref 8–23)
CA-I BLD-SCNC: 1.14 MMOL/L (ref 1.13–1.33)
CALCIUM SERPL-MCNC: 8.4 MG/DL (ref 8.6–10.4)
CHLORIDE SERPL-SCNC: 102 MMOL/L (ref 98–107)
CO2 SERPL-SCNC: 28 MMOL/L (ref 20–31)
CREAT SERPL-MCNC: 0.64 MG/DL (ref 0.5–0.9)
GFR SERPL CREATININE-BSD FRML MDRD: >60 ML/MIN/1.73M2
GLUCOSE BLD-MCNC: 85 MG/DL (ref 65–105)
GLUCOSE SERPL-MCNC: 87 MG/DL (ref 70–99)
HCT VFR BLD AUTO: 27.2 % (ref 36.3–47.1)
HGB BLD-MCNC: 8.6 G/DL (ref 11.9–15.1)
INR PPP: 2.6
MAGNESIUM SERPL-MCNC: 1.8 MG/DL (ref 1.6–2.6)
MCH RBC QN AUTO: 29.3 PG (ref 25.2–33.5)
MCHC RBC AUTO-ENTMCNC: 31.6 G/DL (ref 28.4–34.8)
MCV RBC AUTO: 92.5 FL (ref 82.6–102.9)
NRBC AUTOMATED: 0 PER 100 WBC
PDW BLD-RTO: 13.1 % (ref 11.8–14.4)
PLATELET # BLD AUTO: 163 K/UL (ref 138–453)
PMV BLD AUTO: 10.8 FL (ref 8.1–13.5)
POTASSIUM SERPL-SCNC: 3.3 MMOL/L (ref 3.7–5.3)
PROTHROMBIN TIME: 27.6 SEC (ref 11.7–14.9)
RBC # BLD: 2.94 M/UL (ref 3.95–5.11)
SODIUM SERPL-SCNC: 138 MMOL/L (ref 135–144)
WBC # BLD AUTO: 8.7 K/UL (ref 3.5–11.3)

## 2023-04-08 PROCEDURE — 6370000000 HC RX 637 (ALT 250 FOR IP)

## 2023-04-08 PROCEDURE — 71045 X-RAY EXAM CHEST 1 VIEW: CPT

## 2023-04-08 PROCEDURE — 83735 ASSAY OF MAGNESIUM: CPT

## 2023-04-08 PROCEDURE — 2580000003 HC RX 258: Performed by: NURSE PRACTITIONER

## 2023-04-08 PROCEDURE — 36415 COLL VENOUS BLD VENIPUNCTURE: CPT

## 2023-04-08 PROCEDURE — 80048 BASIC METABOLIC PNL TOTAL CA: CPT

## 2023-04-08 PROCEDURE — 82947 ASSAY GLUCOSE BLOOD QUANT: CPT

## 2023-04-08 PROCEDURE — 85610 PROTHROMBIN TIME: CPT

## 2023-04-08 PROCEDURE — 82330 ASSAY OF CALCIUM: CPT

## 2023-04-08 PROCEDURE — 94761 N-INVAS EAR/PLS OXIMETRY MLT: CPT

## 2023-04-08 PROCEDURE — 6370000000 HC RX 637 (ALT 250 FOR IP): Performed by: PHYSICIAN ASSISTANT

## 2023-04-08 PROCEDURE — 85027 COMPLETE CBC AUTOMATED: CPT

## 2023-04-08 PROCEDURE — 6370000000 HC RX 637 (ALT 250 FOR IP): Performed by: NURSE PRACTITIONER

## 2023-04-08 PROCEDURE — 6360000002 HC RX W HCPCS: Performed by: NURSE PRACTITIONER

## 2023-04-08 RX ORDER — POTASSIUM CHLORIDE 20 MEQ/1
40 TABLET, EXTENDED RELEASE ORAL ONCE
Status: COMPLETED | OUTPATIENT
Start: 2023-04-08 | End: 2023-04-08

## 2023-04-08 RX ORDER — POLYETHYLENE GLYCOL 3350 17 G/17G
17 POWDER, FOR SOLUTION ORAL DAILY
Qty: 30 EACH | Refills: 0 | Status: SHIPPED | OUTPATIENT
Start: 2023-04-09 | End: 2023-05-09

## 2023-04-08 RX ORDER — ATORVASTATIN CALCIUM 20 MG/1
20 TABLET, FILM COATED ORAL NIGHTLY
Qty: 30 TABLET | Refills: 3 | Status: SHIPPED | OUTPATIENT
Start: 2023-04-08

## 2023-04-08 RX ORDER — LOSARTAN POTASSIUM 25 MG/1
25 TABLET ORAL DAILY
Qty: 30 TABLET | Refills: 3 | Status: SHIPPED | OUTPATIENT
Start: 2023-04-09

## 2023-04-08 RX ORDER — WARFARIN SODIUM 2.5 MG/1
2.5 TABLET ORAL DAILY
Qty: 30 TABLET | Refills: 3 | Status: SHIPPED | OUTPATIENT
Start: 2023-04-08

## 2023-04-08 RX ORDER — PANTOPRAZOLE SODIUM 40 MG/1
40 TABLET, DELAYED RELEASE ORAL DAILY
Qty: 30 TABLET | Refills: 0 | Status: SHIPPED | OUTPATIENT
Start: 2023-04-09

## 2023-04-08 RX ORDER — AMIODARONE HYDROCHLORIDE 200 MG/1
200 TABLET ORAL 2 TIMES DAILY
Qty: 28 TABLET | Refills: 0 | Status: SHIPPED | OUTPATIENT
Start: 2023-04-08 | End: 2023-04-22

## 2023-04-08 RX ORDER — OXYCODONE HYDROCHLORIDE AND ACETAMINOPHEN 5; 325 MG/1; MG/1
1 TABLET ORAL EVERY 8 HOURS PRN
Qty: 9 TABLET | Refills: 0 | Status: SHIPPED | OUTPATIENT
Start: 2023-04-08 | End: 2023-04-11

## 2023-04-08 RX ORDER — WARFARIN SODIUM 2.5 MG/1
2.5 TABLET ORAL
Status: DISCONTINUED | OUTPATIENT
Start: 2023-04-08 | End: 2023-04-08 | Stop reason: HOSPADM

## 2023-04-08 RX ADMIN — ASPIRIN 81 MG: 81 TABLET, COATED ORAL at 08:43

## 2023-04-08 RX ADMIN — SPIRONOLACTONE 12.5 MG: 25 TABLET ORAL at 08:47

## 2023-04-08 RX ADMIN — SENNOSIDES AND DOCUSATE SODIUM 1 TABLET: 50; 8.6 TABLET ORAL at 08:44

## 2023-04-08 RX ADMIN — LOSARTAN POTASSIUM 25 MG: 25 TABLET, FILM COATED ORAL at 08:44

## 2023-04-08 RX ADMIN — FUROSEMIDE 20 MG: 10 INJECTION, SOLUTION INTRAMUSCULAR; INTRAVENOUS at 08:43

## 2023-04-08 RX ADMIN — POTASSIUM CHLORIDE 40 MEQ: 1500 TABLET, EXTENDED RELEASE ORAL at 14:54

## 2023-04-08 RX ADMIN — METOPROLOL TARTRATE 25 MG: 25 TABLET, FILM COATED ORAL at 08:43

## 2023-04-08 RX ADMIN — PANTOPRAZOLE SODIUM 40 MG: 40 TABLET, DELAYED RELEASE ORAL at 08:43

## 2023-04-08 RX ADMIN — SODIUM CHLORIDE, PRESERVATIVE FREE 10 ML: 5 INJECTION INTRAVENOUS at 08:43

## 2023-04-08 RX ADMIN — AMIODARONE HYDROCHLORIDE 200 MG: 200 TABLET ORAL at 14:54

## 2023-04-08 RX ADMIN — AMIODARONE HYDROCHLORIDE 200 MG: 200 TABLET ORAL at 08:42

## 2023-04-08 RX ADMIN — OXYCODONE HYDROCHLORIDE AND ACETAMINOPHEN 1 TABLET: 5; 325 TABLET ORAL at 12:15

## 2023-04-08 RX ADMIN — OXYCODONE HYDROCHLORIDE AND ACETAMINOPHEN 1 TABLET: 5; 325 TABLET ORAL at 04:50

## 2023-04-08 ASSESSMENT — PAIN DESCRIPTION - LOCATION
LOCATION: GENERALIZED;CHEST
LOCATION: CHEST;INCISION

## 2023-04-08 ASSESSMENT — PAIN SCALES - GENERAL
PAINLEVEL_OUTOF10: 6
PAINLEVEL_OUTOF10: 5

## 2023-04-08 ASSESSMENT — PAIN DESCRIPTION - DESCRIPTORS: DESCRIPTORS: ACHING

## 2023-04-08 NOTE — DISCHARGE SUMMARY
[facility] [address]    Discharge Summary     Patient ID: Willa Tom  :  1955   MRN: [id number]     ACCOUNT:  [de-identified]   Patient's PCP: Pcp No  Admit Date: 3/24/2023   Discharge Date: 2023   Length of Stay: 15  Code Status:  Full Code  Admitting Physician: Dr Neville Donis  Discharge Physician: SHERRI Foster CNP     Active Discharge Diagnoses:     Hospital Problem Lists:  Principal Problem:    S/P AVR  Active Problems:    NSTEMI (non-ST elevated myocardial infarction) ([facility] [address]    Severe aortic stenosis    Acute systolic CHF (congestive heart failure) ([facility] [address]    Nonrheumatic aortic valve stenosis  Resolved Problems:    * No resolved hospital problems. *      Admission Condition:  good     Discharged Condition: good    Hospital Stay:     HPI: The patient is a 78 y.o.  female who is admitted to the hospital for chest pain and shortness of breath. The patient has prior cardiac history of aortic stenosis, was being evaluated for surgery, was lost to follow up and came to the [place] for visiting. CTS consult with noted severe aortic stenosis. Patient states has had shortness of breath ever since she has been visiting her daughter which has been on average 4 to 7 months. Patient denies syncope. Currently has no chest pain at this time. Very poor historian no medical concerns. No surgeries. Does not take any medication at home. No herbal medications with blood thinning effects. Currently resting with her daughter to assist with translation. We used  provided by "[employer]. She does not drink smoke use drugs. She has never smoked before. Appreciates the time and effort with her care    Hospital Course:  Patient underwent complete cardiac work up prior to surgery, without findings of MVCAD. She was taken to surgery for AVR with Dr Kimberli Sullivan on 2023 now s/p AVR 21mm inspiris.  Surgery went well

## 2023-04-08 NOTE — DISCHARGE INSTRUCTIONS
early graft closure, other blockages, new heart attacks, and possibly even death. SLEEPING   ? If you have trouble sleeping during the night, take your pain medication at bedtime. SUPPORT STOCKINGS   ? Wear at home for 1 month and when the swelling in your legs go away. Take them off at night when you go to bed. ?   A family member needs to put them on you, it takes more than 10 pounds of pressure to put them on.   ? Hand or machine wash. Line dry. SWELLING OF LEGS   ? It is common to have leg swelling, especially if you have a leg incision. ? It is helpful to keep your legs elevated when you are sitting or lie down and elevate your legs on pillows. AWARENESS OF HEART BEATING   ? You may feel your heart is beating stronger or that your heart is beating in your neck and ears. DON'T WORRY - this is common especially at bedtime. VIDEO   ? This is a video link to watch about discharge. Type into computer and you will be able to watch them. [email or web address]            CALL YOUR SURGEON IF YOU HAVE:   ? Rapid heart rate or fluttering in your chest   ? Fever over 101° F.   ? Weight gain or loss of 3 pounds overnight or 5      pounds in one week   ? Persistent nausea or vomiting        ? ANY NEW STERNAL DRAINAGE   ? Excessive LEG drainage or very red incision   ? Increasing shortness of breath   ? Pain unrelieved by prescribed medication    OFFICE VISITS   ? BRING YOUR MEDICATION LIST and medications even over the counter medications to all your follow up appointments. ?    You should have a follow up appointment in the office in about 1 month after discharge from the hospital.   Office Location:  [address] [place] [address], [facility] [address] # [phone], [place], [address]   ? You may call the office to make an appointment, if you don't have an appointment. [phone]     You will need to have a chest xray and labs completed 3-7 days before your follow up

## 2023-04-08 NOTE — DISCHARGE INSTR - COC
Treatments: ***    Patient's personal belongings (please select all that are sent with patient):  {CHP DME Belongings:[id number]}    RN SIGNATURE:  {Esignature:[id number]}    CASE MANAGEMENT/SOCIAL WORK SECTION    Inpatient Status Date: ***    Readmission Risk Assessment Score:  Readmission Risk              Risk of Unplanned Readmission:  19           Discharging to Facility/ Agency   Name:   Address:  Phone:  Fax:    Dialysis Facility (if applicable)   Name:  Address:  Dialysis Schedule:  Phone:  Fax:    / signature: {Esignature:[id number]}    PHYSICIAN SECTION    Prognosis: Good    Condition at Discharge: Stable    Rehab Potential (if transferring to Rehab): Good    Recommended Labs or Other Treatments After Discharge: INR will need to be closely monitored for 3 months post surgery while on coumadin. INR goal 2-3    Physician Certification: I certify the above information and transfer of Mónica Basilio  is necessary for the continuing treatment of the diagnosis listed and that she requires [address] for less 30 days.      Update Admission H&P: No change in H&P    PHYSICIAN SIGNATURE:  Electronically signed by SHERRI Johnson CNP on 4/8/23 at 9:44 AM EDT

## 2023-04-08 NOTE — CARE COORDINATION
Discussed possible PMR consult with Juli Matias NP, during rounds. [address] noted DME order for [place], spoke with Magaly Meadows, manager, who suggesting contacting Casandra Peguero with [employer] to determine if they had any programs for international patients, writer spoke with Casandra Peguero, and per Casandra Peguero, he is not aware of any programs for international patients; however, will check and let writer know. 1253 - Received call from Casandra Peguero at [place] [address], he states that it would be on [employer] to follow-up with the [place] after patient leaves the hospital, Casandra Peguero is aware that pt plans to return to [facility]. Writer placed call to [facility] and left message asking that she contact Casandra Peguero with [place] [address]. 4390 - Writer spoke with [facility] [place], she will look at a 1-month contract if Casandra Peguero from [place] [address] sends it over. Writer updated Nickolas at [place] [address], provided him with the referral information also.     1526 - Per message from [facility], she signed contract for [employer].    6731 - Per Casandra Peguero at [place] [address], waiting to hear back from his office on the approval for the [place].
Met with patient and daughter to discuss transitional planning. Patient will return home with daughter. She is concerned about the bill. Explained pending Medicaid and also notified of phone number to call when patient would get hospital bill.  She will need med assist. She denies other needs
Met with patient and daughter to discuss transitional planning. They relate that plan is for AVR tomorrow. Their goal is for patient to return home with daughter at discharge.  They deny needs
TRansitional planning    Loyd Zamora with [facility] [phone] and he relates patient was fitted on Thursday 1015 Spoke to patient's dtr at bedside about plan for discharge. Plan is for patient to go home with dtr. Patient does not have a walker at home. CM asked dtr if she is able to buy one for her Mom if CM does not have one to give her. She said she could. Patient's RN confirms that patient has Life Vest.     Lynda 91 to beds and spoke to Dahiana. She relates meds are already vouchered. Percocet and Glycolax not covered. Patient's Rn relates patient will be following up at [facility] coumadin clinic. [address] to patient's dtr and gave her # and address for [employer] [place] if she wants to try and get a walker for free. She said she will probably buy it from [employer].
Present Housing: (P) Yes (Plan is to return to daughter's home in [place].)  Other Identified Issues/Barriers to RETURNING to current housing: None noted at this time. Potential Assistance needed at discharge: (P) Other (Comment) (Needs dependent upon clinical course.)            Potential DME:    Patient expects to discharge to: (P) [facility] for transportation at discharge: (P) Family    Financial    Payor: /     Does insurance require precert for SNF: N/a, pt has no insurance. Potential assistance Purchasing Medications: (P) Yes  Meds-to-Beds request: Yes    No Pharmacies Listed    Notes:    Factors facilitating achievement of predicted outcomes: Family support    Barriers to discharge: Medical complications    Additional Case Management Notes: Plan is to go to daughter's home after discharge. [place] [facility] [place] [facility] [place]. Daughter, Carmelita Sewell, to provide transportation. The Plan for Transition of Care is related to the following treatment goals of NSTEMI (non-ST elevated myocardial infarction) ([facility] [address] [Z39.8]    IF APPLICABLE: The Patient and/or patient representative [place] and her family were provided with a choice of provider and agrees with the discharge plan. Freedom of choice list with basic dialogue that supports the patient's individualized plan of care/goals and shares the quality data associated with the providers was provided to:     Patient Representative Name:       The Patient and/or Patient Representative Agree with the Discharge Plan?   Yes    Jennifer Mata RN  Case Management Department  Ph: [phone] Fax: [phone]

## 2023-04-08 NOTE — DISCHARGE INSTR - DIET

## 2023-04-08 NOTE — PLAN OF CARE
Discharge order received. IV removed, discharge instructions provided in verbal and written form, patient dressed and waiting for ride to home.
Eval for AVR (SAVR vsTAVR) decision to be made on Monday. Reviewed preop studies. Await final decision.  Anticipate surgery mid next week
Problem: Discharge Planning  Goal: Discharge to home or other facility with appropriate resources  3/25/2023 1030 by Álvaro White RN  Outcome: Progressing  3/25/2023 0141 by Marsha Ibarra RN  Outcome: Progressing     Problem: ABCDS Injury Assessment  Goal: Absence of physical injury  3/25/2023 1030 by Álvaro White RN  Outcome: Progressing  3/25/2023 0141 by Marsha Ibarra RN  Outcome: Progressing     Problem: Pain  Goal: Verbalizes/displays adequate comfort level or baseline comfort level  3/25/2023 1030 by Álvaro White RN  Outcome: Progressing  3/25/2023 0141 by Marsha Ibarra RN  Outcome: Progressing     Problem: Nutrition Deficit:  Goal: Optimize nutritional status  3/25/2023 1030 by Álvaro White RN  Outcome: Progressing  3/25/2023 1016 by Yves Chinchilla RD  Flowsheets (Taken 3/25/2023 1016)  Nutrient intake appropriate for improving, restoring, or maintaining nutritional needs:   Recommend appropriate diets, oral nutritional supplements, and vitamin/mineral supplements   Monitor oral intake, labs, and treatment plans   Assess nutritional status and recommend course of action
Problem: Discharge Planning  Goal: Discharge to home or other facility with appropriate resources  3/25/2023 2221 by Dania Maya RN  Outcome: Progressing  Flowsheets  Taken 3/25/2023 2000 by Dania Maya RN  Discharge to home or other facility with appropriate resources:   Identify barriers to discharge with patient and caregiver   Arrange for needed discharge resources and transportation as appropriate   Arrange for interpreters to assist at discharge as needed  Taken 3/25/2023 1600 by Royce Mathew RN  Discharge to home or other facility with appropriate resources: Identify barriers to discharge with patient and caregiver  Taken 3/25/2023 1200 by Royce Mathew RN  Discharge to home or other facility with appropriate resources: Identify barriers to discharge with patient and caregiver  3/25/2023 1030 by Royce Mathew RN  Outcome: Progressing  Flowsheets (Taken 3/25/2023 0800)  Discharge to home or other facility with appropriate resources: Identify barriers to discharge with patient and caregiver     Problem: ABCDS Injury Assessment  Goal: Absence of physical injury  3/25/2023 2221 by Dania Maya RN  Outcome: Progressing  3/25/2023 1030 by Royce Mathew RN  Outcome: Progressing     Problem: Pain  Goal: Verbalizes/displays adequate comfort level or baseline comfort level  3/25/2023 2221 by Dania Maya RN  Outcome: Progressing  3/25/2023 1030 by Royce Mathew RN  Outcome: Progressing     Problem: Nutrition Deficit:  Goal: Optimize nutritional status  3/25/2023 2221 by Dania Maya RN  Outcome: Progressing  3/25/2023 1030 by Royce Mathew RN  Outcome: Progressing  3/25/2023 1016 by Abbie Branch RD  Flowsheets (Taken 3/25/2023 1016)  Nutrient intake appropriate for improving, restoring, or maintaining nutritional needs:   Recommend appropriate diets, oral nutritional supplements, and vitamin/mineral supplements   Monitor oral intake, labs, and
Problem: Discharge Planning  Goal: Discharge to home or other facility with appropriate resources  3/28/2023 1154 by Zehra Araya RN  Outcome: Progressing  3/28/2023 0642 by Katharina Richards RN  Outcome: Progressing     Problem: ABCDS Injury Assessment  Goal: Absence of physical injury  3/28/2023 1154 by Zehra Araya RN  Outcome: Progressing  3/28/2023 0642 by Katharina Richards RN  Outcome: Progressing     Problem: Pain  Goal: Verbalizes/displays adequate comfort level or baseline comfort level  3/28/2023 1154 by Zehra Aarya RN  Outcome: Progressing  3/28/2023 0642 by Katharina Richards RN  Outcome: Progressing     Problem: Nutrition Deficit:  Goal: Optimize nutritional status  3/28/2023 1154 by Zehra Araya RN  Outcome: Progressing  3/28/2023 0642 by Katharina Richards RN  Outcome: Progressing
Problem: Discharge Planning  Goal: Discharge to home or other facility with appropriate resources  3/29/2023 0040 by Leslee Gilbert RN  Outcome: Progressing  Flowsheets  Taken 3/28/2023 1600 by Jeet Lacey RN  Discharge to home or other facility with appropriate resources: Identify barriers to discharge with patient and caregiver  Taken 3/28/2023 1200 by Jeet Lacey RN  Discharge to home or other facility with appropriate resources: Identify barriers to discharge with patient and caregiver  3/28/2023 1154 by Jeet Lacey RN  Outcome: Progressing  Flowsheets (Taken 3/28/2023 0800)  Discharge to home or other facility with appropriate resources: Identify barriers to discharge with patient and caregiver     Problem: ABCDS Injury Assessment  Goal: Absence of physical injury  3/29/2023 0040 by Leslee Gilbert RN  Outcome: Progressing  3/28/2023 1154 by Jeet Lacey RN  Outcome: Progressing     Problem: Pain  Goal: Verbalizes/displays adequate comfort level or baseline comfort level  3/29/2023 0040 by Leslee Gilbert RN  Outcome: Progressing  3/28/2023 1154 by Jeet Lacey RN  Outcome: Progressing     Problem: Nutrition Deficit:  Goal: Optimize nutritional status  3/29/2023 0040 by Leslee Gilbert RN  Outcome: Progressing  3/28/2023 1154 by Jeet Lacey RN  Outcome: Progressing
Problem: Discharge Planning  Goal: Discharge to home or other facility with appropriate resources  3/29/2023 2350 by Rosanne Bashir RN  Outcome: Progressing  3/29/2023 1821 by Drake Huntley RN  Outcome: Progressing     Problem: ABCDS Injury Assessment  Goal: Absence of physical injury  3/29/2023 2350 by Rosanne Bashir RN  Outcome: Progressing  3/29/2023 1821 by Drake Huntley RN  Outcome: Progressing  Flowsheets (Taken 3/29/2023 0800)  Absence of Physical Injury: Implement safety measures based on patient assessment     Problem: Pain  Goal: Verbalizes/displays adequate comfort level or baseline comfort level  3/29/2023 2350 by Rosanne Bashir RN  Outcome: Progressing  3/29/2023 1821 by Drake Huntley RN  Outcome: Progressing     Problem: Nutrition Deficit:  Goal: Optimize nutritional status  3/29/2023 2350 by Rosanne Bashir RN  Outcome: Progressing  3/29/2023 1821 by Drake Huntley RN  Outcome: Progressing     Problem: Chronic Conditions and Co-morbidities  Goal: Patient's chronic conditions and co-morbidity symptoms are monitored and maintained or improved  3/29/2023 2350 by Rosanne Bashir RN  Outcome: Progressing  3/29/2023 1821 by Drake Huntley RN  Outcome: Progressing
Problem: Discharge Planning  Goal: Discharge to home or other facility with appropriate resources  4/1/2023 0310 by Lesli Cueva RN  Outcome: Progressing  3/31/2023 1759 by Selvin Yanez RN  Outcome: Progressing     Problem: ABCDS Injury Assessment  Goal: Absence of physical injury  4/1/2023 0310 by Lesli Cueva RN  Outcome: Progressing  3/31/2023 1759 by Selvin Yanez RN  Outcome: Progressing     Problem: Pain  Goal: Verbalizes/displays adequate comfort level or baseline comfort level  4/1/2023 0310 by Lesli Cueva RN  Outcome: Progressing  3/31/2023 1759 by Selvin Yanez RN  Outcome: Progressing     Problem: Nutrition Deficit:  Goal: Optimize nutritional status  4/1/2023 0310 by Lesli Cueva RN  Outcome: Progressing  3/31/2023 1759 by Selvin Yanez RN  Outcome: Progressing     Problem: Chronic Conditions and Co-morbidities  Goal: Patient's chronic conditions and co-morbidity symptoms are monitored and maintained or improved  4/1/2023 0310 by Lesli Cueva RN  Outcome: Progressing  3/31/2023 1759 by Selvin Yanez RN  Outcome: Progressing
Problem: Discharge Planning  Goal: Discharge to home or other facility with appropriate resources  4/1/2023 1618 by Leonel Royal RN  Outcome: Progressing  4/1/2023 0310 by Lesli Cueva RN  Outcome: Progressing     Problem: ABCDS Injury Assessment  Goal: Absence of physical injury  4/1/2023 1618 by Leonel Royal RN  Outcome: Progressing  4/1/2023 0310 by Lesli Cueva RN  Outcome: Progressing     Problem: Pain  Goal: Verbalizes/displays adequate comfort level or baseline comfort level  4/1/2023 1618 by Leonel Royal RN  Outcome: Progressing  4/1/2023 0310 by Lesli Cueva RN  Outcome: Progressing     Problem: Nutrition Deficit:  Goal: Optimize nutritional status  4/1/2023 1618 by Leonel Royal RN  Outcome: Progressing  4/1/2023 0310 by Lesli Cueva RN  Outcome: Progressing     Problem: Chronic Conditions and Co-morbidities  Goal: Patient's chronic conditions and co-morbidity symptoms are monitored and maintained or improved  4/1/2023 1618 by Leonel Royal RN  Outcome: Progressing  4/1/2023 0310 by Lesli Cueva RN  Outcome: Progressing
Problem: Discharge Planning  Goal: Discharge to home or other facility with appropriate resources  4/2/2023 0320 by Pedro Lozano RN  Outcome: Progressing  4/1/2023 1618 by Ulysess Olvera RN  Outcome: Progressing     Problem: ABCDS Injury Assessment  Goal: Absence of physical injury  4/2/2023 0320 by Pedro Lozano RN  Outcome: Progressing  4/1/2023 1618 by Ulysses Olvera RN  Outcome: Progressing     Problem: Pain  Goal: Verbalizes/displays adequate comfort level or baseline comfort level  4/2/2023 0320 by Pedro Lozano RN  Outcome: Progressing  4/1/2023 1618 by Ulysses Olvera RN  Outcome: Progressing     Problem: Nutrition Deficit:  Goal: Optimize nutritional status  4/2/2023 0320 by Pedro Lozano RN  Outcome: Progressing  4/1/2023 1618 by Ulysses Olvera RN  Outcome: Progressing     Problem: Chronic Conditions and Co-morbidities  Goal: Patient's chronic conditions and co-morbidity symptoms are monitored and maintained or improved  4/2/2023 0320 by Pedro Lozano RN  Outcome: Progressing  4/1/2023 1618 by Ulysses Olvera RN  Outcome: Progressing
Problem: Discharge Planning  Goal: Discharge to home or other facility with appropriate resources  4/2/2023 1715 by Melo Chapa RN  Outcome: Progressing  4/2/2023 0320 by Cristi Chavez RN  Outcome: Progressing     Problem: ABCDS Injury Assessment  Goal: Absence of physical injury  4/2/2023 1715 by Melo Chapa RN  Outcome: Progressing  4/2/2023 0320 by Cristi Chavez RN  Outcome: Progressing     Problem: Pain  Goal: Verbalizes/displays adequate comfort level or baseline comfort level  4/2/2023 1715 by Melo Chapa RN  Outcome: Progressing  4/2/2023 0320 by Cristi Chavez RN  Outcome: Progressing     Problem: Nutrition Deficit:  Goal: Optimize nutritional status  4/2/2023 1715 by Melo Chapa RN  Outcome: Progressing  4/2/2023 0320 by Cristi Chavez RN  Outcome: Progressing     Problem: Chronic Conditions and Co-morbidities  Goal: Patient's chronic conditions and co-morbidity symptoms are monitored and maintained or improved  4/2/2023 1715 by Melo Chapa RN  Outcome: Progressing  4/2/2023 0320 by Cristi Chavez RN  Outcome: Progressing
Problem: Discharge Planning  Goal: Discharge to home or other facility with appropriate resources  4/3/2023 0430 by Giuseppe Nash RN  Outcome: Progressing  4/2/2023 1715 by Kike Garcia RN  Outcome: Progressing     Problem: ABCDS Injury Assessment  Goal: Absence of physical injury  4/3/2023 0430 by Giuseppe Nash RN  Outcome: Progressing  4/2/2023 1715 by Kike Garcia RN  Outcome: Progressing     Problem: Pain  Goal: Verbalizes/displays adequate comfort level or baseline comfort level  4/3/2023 0430 by Giuseppe Nash RN  Outcome: Progressing  4/2/2023 1715 by Kike Garcia RN  Outcome: Progressing     Problem: Nutrition Deficit:  Goal: Optimize nutritional status  4/3/2023 0430 by Giuseppe Nash RN  Outcome: Progressing  4/2/2023 1715 by Kike Garcia RN  Outcome: Progressing     Problem: Chronic Conditions and Co-morbidities  Goal: Patient's chronic conditions and co-morbidity symptoms are monitored and maintained or improved  4/3/2023 0430 by Giuseppe Nash RN  Outcome: Progressing  4/2/2023 1715 by Kike Garcia RN  Outcome: Progressing
Problem: Discharge Planning  Goal: Discharge to home or other facility with appropriate resources  4/3/2023 1439 by Kaycee Caldera RN  Outcome: Progressing  Flowsheets (Taken 4/3/2023 0805)  Discharge to home or other facility with appropriate resources: Identify barriers to discharge with patient and caregiver  4/3/2023 0430 by Yohana Burton RN  Outcome: Progressing     Problem: ABCDS Injury Assessment  Goal: Absence of physical injury  4/3/2023 1439 by Kaycee Caldera RN  Outcome: Progressing  4/3/2023 0430 by Yohana Burton RN  Outcome: Progressing     Problem: Pain  Goal: Verbalizes/displays adequate comfort level or baseline comfort level  4/3/2023 1439 by Kaycee Caldera RN  Outcome: Progressing  4/3/2023 0430 by Yohana Burton RN  Outcome: Progressing     Problem: Nutrition Deficit:  Goal: Optimize nutritional status  4/3/2023 1439 by Kaycee Caldera RN  Outcome: Progressing  4/3/2023 0430 by Yohana Burton RN  Outcome: Progressing     Problem: Chronic Conditions and Co-morbidities  Goal: Patient's chronic conditions and co-morbidity symptoms are monitored and maintained or improved  4/3/2023 1439 by Kaycee Caldera RN  Outcome: Progressing  Flowsheets (Taken 4/3/2023 0805)  Care Plan - Patient's Chronic Conditions and Co-Morbidity Symptoms are Monitored and Maintained or Improved: Monitor and assess patient's chronic conditions and comorbid symptoms for stability, deterioration, or improvement  4/3/2023 0430 by Yohana Burton RN  Outcome: Progressing
Problem: Discharge Planning  Goal: Discharge to home or other facility with appropriate resources  4/4/2023 0325 by Ilene Lozano RN  Outcome: Progressing  4/3/2023 1439 by Xiomara Granger RN  Outcome: Progressing  Flowsheets (Taken 4/3/2023 0805)  Discharge to home or other facility with appropriate resources: Identify barriers to discharge with patient and caregiver     Problem: ABCDS Injury Assessment  Goal: Absence of physical injury  4/4/2023 0325 by Ilene Lozano RN  Outcome: Progressing  4/3/2023 1439 by Xiomara Granger RN  Outcome: Progressing     Problem: Pain  Goal: Verbalizes/displays adequate comfort level or baseline comfort level  4/4/2023 0325 by Ilene Lozano RN  Outcome: Progressing  4/3/2023 1439 by Xiomara Granger RN  Outcome: Progressing     Problem: Nutrition Deficit:  Goal: Optimize nutritional status  4/4/2023 0325 by Ilene Lozano RN  Outcome: Progressing  4/3/2023 1439 by Xiomara Granger RN  Outcome: Progressing     Problem: Chronic Conditions and Co-morbidities  Goal: Patient's chronic conditions and co-morbidity symptoms are monitored and maintained or improved  4/4/2023 0325 by Ilene Lozano RN  Outcome: Progressing  4/3/2023 1439 by Xiomara Granger RN  Outcome: Progressing  Flowsheets (Taken 4/3/2023 0805)  Care Plan - Patient's Chronic Conditions and Co-Morbidity Symptoms are Monitored and Maintained or Improved: Monitor and assess patient's chronic conditions and comorbid symptoms for stability, deterioration, or improvement
Problem: Discharge Planning  Goal: Discharge to home or other facility with appropriate resources  4/4/2023 1606 by Raven Toledo RN  Outcome: Progressing  4/4/2023 0325 by Ilene Lozano RN  Outcome: Progressing     Problem: ABCDS Injury Assessment  Goal: Absence of physical injury  4/4/2023 1606 by Raven Toledo RN  Outcome: Progressing  4/4/2023 0325 by Ilene Lozano RN  Outcome: Progressing     Problem: Pain  Goal: Verbalizes/displays adequate comfort level or baseline comfort level  4/4/2023 1606 by Raven Toledo RN  Outcome: Progressing  4/4/2023 0325 by Ilene Lozano RN  Outcome: Progressing     Problem: Nutrition Deficit:  Goal: Optimize nutritional status  4/4/2023 1606 by Raven Toledo RN  Outcome: Progressing  4/4/2023 0325 by Ilene Lozano RN  Outcome: Progressing     Problem: Chronic Conditions and Co-morbidities  Goal: Patient's chronic conditions and co-morbidity symptoms are monitored and maintained or improved  4/4/2023 1606 by Raven Toledo RN  Outcome: Progressing  4/4/2023 0325 by Ilene Lozano RN  Outcome: Progressing     Problem: Safety - Adult  Goal: Free from fall injury  Outcome: Progressing
Problem: Discharge Planning  Goal: Discharge to home or other facility with appropriate resources  Outcome: Progressing     Problem: ABCDS Injury Assessment  Goal: Absence of physical injury  Outcome: Progressing     Problem: Pain  Goal: Verbalizes/displays adequate comfort level or baseline comfort level  Outcome: Progressing
Problem: Discharge Planning  Goal: Discharge to home or other facility with appropriate resources  Outcome: Progressing     Problem: ABCDS Injury Assessment  Goal: Absence of physical injury  Outcome: Progressing     Problem: Pain  Goal: Verbalizes/displays adequate comfort level or baseline comfort level  Outcome: Progressing     Problem: Nutrition Deficit:  Goal: Optimize nutritional status  Outcome: Progressing
Problem: Discharge Planning  Goal: Discharge to home or other facility with appropriate resources  Outcome: Progressing     Problem: ABCDS Injury Assessment  Goal: Absence of physical injury  Outcome: Progressing     Problem: Pain  Goal: Verbalizes/displays adequate comfort level or baseline comfort level  Outcome: Progressing     Problem: Nutrition Deficit:  Goal: Optimize nutritional status  Outcome: Progressing
Problem: Discharge Planning  Goal: Discharge to home or other facility with appropriate resources  Outcome: Progressing     Problem: ABCDS Injury Assessment  Goal: Absence of physical injury  Outcome: Progressing     Problem: Pain  Goal: Verbalizes/displays adequate comfort level or baseline comfort level  Outcome: Progressing     Problem: Nutrition Deficit:  Goal: Optimize nutritional status  Outcome: Progressing     Problem: Chronic Conditions and Co-morbidities  Goal: Patient's chronic conditions and co-morbidity symptoms are monitored and maintained or improved  Outcome: Progressing
Problem: Discharge Planning  Goal: Discharge to home or other facility with appropriate resources  Outcome: Progressing     Problem: ABCDS Injury Assessment  Goal: Absence of physical injury  Outcome: Progressing     Problem: Pain  Goal: Verbalizes/displays adequate comfort level or baseline comfort level  Outcome: Progressing  Flowsheets (Taken 4/7/2023 1830 by Shamika Esparza RN)  Verbalizes/displays adequate comfort level or baseline comfort level: Encourage patient to monitor pain and request assistance     Problem: Nutrition Deficit:  Goal: Optimize nutritional status  Outcome: Progressing     Problem: Chronic Conditions and Co-morbidities  Goal: Patient's chronic conditions and co-morbidity symptoms are monitored and maintained or improved  Outcome: Progressing     Problem: Safety - Adult  Goal: Free from fall injury  Outcome: Progressing     Problem: Respiratory - Adult  Goal: Achieves optimal ventilation and oxygenation  Outcome: Progressing     Problem: Skin/Tissue Integrity  Goal: Absence of new skin breakdown  Description: 1. Monitor for areas of redness and/or skin breakdown  2. Assess vascular access sites hourly  3. Every 4-6 hours minimum:  Change oxygen saturation probe site  4. Every 4-6 hours:  If on nasal continuous positive airway pressure, respiratory therapy assess nares and determine need for appliance change or resting period.   Outcome: Progressing
Problem: Discharge Planning  Goal: Discharge to home or other facility with appropriate resources  Outcome: Progressing     Problem: ABCDS Injury Assessment  Goal: Absence of physical injury  Outcome: Progressing  Flowsheets (Taken 3/29/2023 0800)  Absence of Physical Injury: Implement safety measures based on patient assessment     Problem: Pain  Goal: Verbalizes/displays adequate comfort level or baseline comfort level  Outcome: Progressing     Problem: Nutrition Deficit:  Goal: Optimize nutritional status  Outcome: Progressing     Problem: Chronic Conditions and Co-morbidities  Goal: Patient's chronic conditions and co-morbidity symptoms are monitored and maintained or improved  Outcome: Progressing
Problem: Discharge Planning  Goal: Discharge to home or other facility with appropriate resources  Outcome: Progressing  Flowsheets (Taken 3/30/2023 0900 by Joel Guzman RN)  Discharge to home or other facility with appropriate resources:   Identify barriers to discharge with patient and caregiver   Arrange for needed discharge resources and transportation as appropriate
Problem: Discharge Planning  Goal: Discharge to home or other facility with appropriate resources  Outcome: Progressing  Flowsheets (Taken 4/6/2023 0800)  Discharge to home or other facility with appropriate resources: Identify barriers to discharge with patient and caregiver     Problem: ABCDS Injury Assessment  Goal: Absence of physical injury  Outcome: Progressing     Problem: Pain  Goal: Verbalizes/displays adequate comfort level or baseline comfort level  Outcome: Progressing     Problem: Nutrition Deficit:  Goal: Optimize nutritional status  Outcome: Progressing     Problem: Chronic Conditions and Co-morbidities  Goal: Patient's chronic conditions and co-morbidity symptoms are monitored and maintained or improved  Outcome: Progressing  Flowsheets (Taken 4/6/2023 0800)  Care Plan - Patient's Chronic Conditions and Co-Morbidity Symptoms are Monitored and Maintained or Improved: Monitor and assess patient's chronic conditions and comorbid symptoms for stability, deterioration, or improvement     Problem: Safety - Adult  Goal: Free from fall injury  Outcome: Progressing     Problem: Respiratory - Adult  Goal: Achieves optimal ventilation and oxygenation  Outcome: Progressing  Flowsheets (Taken 4/6/2023 0800)  Achieves optimal ventilation and oxygenation: Assess for changes in respiratory status
Problem: Respiratory - Adult  Goal: Achieves optimal ventilation and oxygenation  Outcome: Progressing
We want to review with surgeons still. If we plan for SAVR it will be likely mid next week. We are awaiting eval with surgeons.    Thank you
We will review Preop studies and discuss plan with surgeons today or tomorrow morning. We appreciate the input from other teams.
Will plan for AVR with root enlargement tomorrow with Dr Jason Mota.    Pre-op studies reviewed, EF 33%, AV PV 5.3, MG 69   Cath clean   Normal aorta on CT   Carotids <50% stenosis   Make NPO at midnight     -SHERRI Rubio, CNP
RN  Outcome: Progressing  4/7/2023 0023 by Benny Proctor RN  Outcome: Progressing

## 2023-04-10 NOTE — PROGRESS NOTES
[id number] [facility] Cardiothoracic Surgical   Progress Note    4/6/2023 10:42 AM  Surgeon:  Vipul Malave          POD# 1    S/P : AVR tissue    Subjective:  Ms. Danielle Hatrman  seen and examined    Vital Signs: BP (!) 183/79 Comment: p.o. blood pessure meds given  Pulse 75   Temp 98.2 °F (36.8 °C) (Oral)   Resp 16   Ht 5' 3\" (1.6 m)   Wt 163 lb 5.8 oz (74.1 kg)   SpO2 96%   BMI 28.94 kg/m²  O2 Flow Rate (L/min): 0 L/min   Admit Weight: Weight: 142 lb (64.4 kg)     Chest: pacing wires: yes, chest tubes:yes, air leak no  CV: 1/6 mid systolic low pitched murmur aortic area, Normal S1, S2, NSR  Lungs: diminished, poor effort  Abd: The abdomen is soft without tenderness, guarding, mass, rebound or organomegaly. Bowel sounds are hypoactive bowel sounds   Lower Extremities: Trace edema  Sternal Incison: covered  CXR-diffuse pulmonary edema with mixed atelectasis. Concerns for Pl effusion. Labs and Studies:  CBC:   Recent Labs     04/04/23 1357 04/05/23 0407 04/06/23 0218   WBC 7.6 7.8 11.0   HGB 9.9* 8.4* 9.0*   HCT 29.4* 26.7* 27.7*   MCV 90.2 93.4 90.2   PLT See Reflexed IPF Result 81* See Reflexed IPF Result     BMP:   Recent Labs     04/04/23 2127 04/05/23 0407 04/06/23  0218    143 141   K 4.1 4.3 4.8   * 113* 111*   CO2 23 23 18*   BUN 11 12 13   CREATININE 0.75 0.75 0.60     PT/INR:   Recent Labs     04/04/23 1357 04/05/23 0407 04/06/23  0218   PROTIME 21.2* 16.0* 18.0*   INR 1.8 1.3 1.5     APTT:   Recent Labs     04/03/23 1938 04/04/23 1357   APTT 36.5 50.9*     I/O:  I/O last 3 completed shifts: In: 1027.6 [I.V.:780; IV Piggyback:247.6]  Out: 9310 [Urine:1655;  Chest Tube:650]      Scheduled Meds:    furosemide  20 mg IntraVENous BID    metoprolol tartrate  25 mg Oral BID    warfarin placeholder: dosing by pharmacy   Other RX Placeholder    furosemide  20 mg Oral Daily    spironolactone  12.5 mg Oral Daily    sodium chloride flush  5-40 mL IntraVENous 2 times per day    aspirin  81 mg Oral Daily    amiodarone
CLINICAL PHARMACY NOTE: MEDS TO BEDS    Total # of Prescriptions Filled: 8   The following medications were delivered to the patient:  Warfarin 2.5      Additional Documentation:
CLINICAL PHARMACY NOTE: MEDS TO BEDS    Total # of Prescriptions Filled: 8   The following medications were delivered to the patient:  Warfarin 2.5mg   Miralax   Protonix 40mg   Lipitor 40mg   Amiodarone 200mh   Percocet 5-325  Losartan 25mg   Metoprolol tart 25     Additional Documentation:     Jeremiah Hinojosa delivered
[facility]  Progress Note                   Date:   4/3/2023  Patient name: Mary Raphael  Date of admission:  3/24/2023  2:43 PM  MRN:   [id number]  YOB: 1955  PCP: Pcp No    Reason for Admission: NSTEMI (non-ST elevated myocardial infarction) ([facility] [address] [I21.4]    Subjective:       Clinical Changes /Abnormalities: Patient seen and examined. , tele reviewed and remains SR . Tele/vitals/labs reviewed . Complains of right groin area , denies numbness tingling to lower extremity PPP      Review of Systems    Medications:   Scheduled Meds:   furosemide  20 mg Oral Daily    sodium chloride flush  5-40 mL IntraVENous 2 times per day    enoxaparin  40 mg SubCUTAneous Daily    metoprolol tartrate  12.5 mg Oral BID    aspirin  81 mg Oral Daily    atorvastatin  40 mg Oral Daily     Continuous Infusions:   sodium chloride       CBC:   Recent Labs     04/02/23  0540   WBC 5.1   HGB 12.2          BMP:    Recent Labs     04/01/23  0946 04/02/23  0540 04/03/23  0542    133* 138   K 4.2 4.1 3.8    101 105   CO2 22 23 24   BUN 16 15 15   CREATININE 0.91* 0.71 0.79   GLUCOSE 138* 91 93       Hepatic:No results for input(s): AST, ALT, ALB, BILITOT, ALKPHOS in the last 72 hours. Troponin:   No results for input(s): TROPHS in the last 72 hours. BNP: No results for input(s): BNP in the last 72 hours. Lipids: No results for input(s): CHOL, HDL in the last 72 hours. Invalid input(s): LDLCALCU  INR: No results for input(s): INR in the last 72 hours. Objective:   Vitals: /78   Pulse 81   Temp 98.6 °F (37 °C) (Oral)   Resp 16   Wt 146 lb 13.2 oz (66.6 kg)   SpO2 95%   General appearance: alert and cooperative with exam  HEENT: Head: Normocephalic, no lesions, without obvious abnormality.   Neck:no JVD, trachea midline, no adenopathy  Lungs: course to auscultation throughout, rales right > left, on RA without distress lying in bed  Heart: Regular rate and rhythm, s1/s2 auscultated,
[facility] Cardiothoracic Surgical   Progress Note    4/6/2023 10:46 AM  Surgeon:  Beltran Shannon          POD# 1    S/P : AVR tissue    Subjective:  Ms. Siddharth Sandhu  seen and examined    Vital Signs: BP (!) 183/79 Comment: p.o. blood pessure meds given  Pulse 75   Temp 98.2 °F (36.8 °C) (Oral)   Resp 16   Ht 5' 3\" (1.6 m)   Wt 163 lb 5.8 oz (74.1 kg)   SpO2 96%   BMI 28.94 kg/m²  O2 Flow Rate (L/min): 0 L/min   Admit Weight: Weight: 142 lb (64.4 kg)     Chest: pacing wires: yes, chest tubes:yes, air leak no  CV: 1/6 mid systolic low pitched murmur aortic area, Normal S1, S2, NSR  Lungs: diminished, poor effort  Abd: The abdomen is soft without tenderness, guarding, mass, rebound or organomegaly. Bowel sounds are hypoactive bowel sounds   Lower Extremities: Trace edema  Sternal Incison: covered    Labs and Studies:  CBC:   Recent Labs     04/04/23  1357 04/05/23 0407 04/06/23 0218   WBC 7.6 7.8 11.0   HGB 9.9* 8.4* 9.0*   HCT 29.4* 26.7* 27.7*   MCV 90.2 93.4 90.2   PLT See Reflexed IPF Result 81* See Reflexed IPF Result     BMP:   Recent Labs     04/04/23 2127 04/05/23 0407 04/06/23 0218    143 141   K 4.1 4.3 4.8   * 113* 111*   CO2 23 23 18*   BUN 11 12 13   CREATININE 0.75 0.75 0.60     PT/INR:   Recent Labs     04/04/23  1357 04/05/23  0407 04/06/23  0218   PROTIME 21.2* 16.0* 18.0*   INR 1.8 1.3 1.5     APTT:   Recent Labs     04/03/23 1938 04/04/23 1357   APTT 36.5 50.9*     I/O:  I/O last 3 completed shifts: In: 1027.6 [I.V.:780; IV Piggyback:247.6]  Out: 7304 [Urine:1655;  Chest Tube:650]      Scheduled Meds:    furosemide  20 mg IntraVENous BID    metoprolol tartrate  25 mg Oral BID    warfarin placeholder: dosing by pharmacy   Other RX Placeholder    furosemide  20 mg Oral Daily    spironolactone  12.5 mg Oral Daily    sodium chloride flush  5-40 mL IntraVENous 2 times per day    aspirin  81 mg Oral Daily    amiodarone  200 mg Oral TID    mupirocin   Each Nostril BID    polyethylene glycol  17 g
Comprehensive Nutrition Assessment    Type and Reason for Visit:  Reassess    Nutrition Recommendations/Plan:   Continue NPO. Monitor for restart of oral diet. Will monitor labs, weights, and plan of care. Malnutrition Assessment:  Malnutrition Status:  No malnutrition (03/25/23 1014)    Context:  Acute Illness     Findings of the 6 clinical characteristics of malnutrition:  Energy Intake:  No significant decrease in energy intake  Weight Loss:  No significant weight loss     Body Fat Loss:  No significant body fat loss   Muscle Mass Loss:  No significant muscle mass loss  Fluid Accumulation:  No significant fluid accumulation   Strength:  Not Performed    Nutrition Assessment:    Pt off unit in OR for AVD with root enlargement today. Pt NPO currently and RN unsure of pt's PO intakes. Per chart review, recorded PO intakes of 50-75% of meals and taking fluids well per chart review. Last BM 4/3. Labs/Meds reviewed. Nutrition Related Findings:    Meds/Labs reviewed. Last BM 4/3. Wound Type: None       Current Nutrition Intake & Therapies:    Average Meal Intake: NPO (Recorded PO intakes of 50-75% per chart review.)  Average Supplements Intake: NPO  Diet NPO    Anthropometric Measures:  Height: 5' 3\" (160 cm)  Ideal Body Weight (IBW): 115 lbs (52 kg)    Admission Body Weight: 142 lb (64.4 kg)  Current Body Weight: 147 lb 4.3 oz (66.8 kg), 128.1 % IBW. Weight Source: Bed Scale  Current BMI (kg/m2): 26.1  Usual Body Weight: 149 lb (67.6 kg)  % Weight Change (Calculated): -4.7  Weight Adjustment For: No Adjustment                 BMI Categories: Overweight (BMI 25.0-29. 9)    Estimated Daily Nutrient Needs:  Energy Requirements Based On: Kcal/kg  Weight Used for Energy Requirements: Current  Energy (kcal/day): 1600-1900kcal/day  Weight Used for Protein Requirements: Current  Protein (g/day): 80gmsPRO/day  Fluid (ml/day): 1500 mL/day per diet/MD    Nutrition Diagnosis:   Inadequate oral intake related to
Comprehensive Nutrition Assessment    Type and Reason for Visit:  Reassess    Nutrition Recommendations/Plan:   Continue current diet. Encourage/monitor PO intakes as tolerated. Will monitor labs, weights, and plan of care. Malnutrition Assessment:  Malnutrition Status:  No malnutrition (03/25/23 1014)    Context:  Acute Illness     Findings of the 6 clinical characteristics of malnutrition:  Energy Intake:  No significant decrease in energy intake  Weight Loss:  No significant weight loss     Body Fat Loss:  No significant body fat loss   Muscle Mass Loss:  No significant muscle mass loss  Fluid Accumulation:  No significant fluid accumulation   Strength:  Not Performed    Nutrition Assessment:    Pt tolerating her current diet well and consuming at least 50% of meals. Continues to have a good appetite. Labs reviewed: Na 134 mmol/L. Meds include: Lasix. Nutrition Related Findings:    Meds/Labs reviewed. Last BM 3/27. Wound Type: None       Current Nutrition Intake & Therapies:    Average Meal Intake: 51-75%  Average Supplements Intake: None Ordered  ADULT DIET; Regular; 1500 ml    Anthropometric Measures:  Height:    Ideal Body Weight (IBW):   ( )    Admission Body Weight: 142 lb (64.4 kg)  Current Body Weight: 146 lb 13.2 oz (66.6 kg),   IBW.  Weight Source: Bed Scale  Current BMI (kg/m2):    Usual Body Weight: 149 lb (67.6 kg)  % Weight Change (Calculated): -4.7  Weight Adjustment For: No Adjustment                      Estimated Daily Nutrient Needs:  Energy Requirements Based On: Kcal/kg  Weight Used for Energy Requirements: Current  Energy (kcal/day): 1600-1900kcal/day  Weight Used for Protein Requirements: Current  Protein (g/day): 80gmsPRO/day  Fluid (ml/day): 1500 mL/day per diet/MD    Nutrition Diagnosis:   Inadequate oral intake related to  (current condition) as evidenced by intake 51-75%    Nutrition Interventions:   Food and/or Nutrient Delivery: Continue Current Diet  Nutrition
Congestive Heart Failure Education completed and charted. CHF booklet given. Patient was receptive to education. Eddie Chan helps with interpretation. Discussed the  importance of medication compliance. Discussed the importance of a heart healthy diet. Discussed 2000 mg sodium-restricted daily diet. Patient instructed to limit fluid intake to  1.5 to 2 liters per day. Patient instructed to weigh self at the same time of each day each morning, reinforced teaching to monitor for 3-5 lb weight increase over 1-2 days notify physician if change noted. Signs and symptoms of CHF discussed with patient, such as feeling more tired than normal, feeling short of breath, coughing that increases when lying down, sudden weight gain, swelling of the feet, legs or belly. Patient verbalized understanding to notify physician office if these symptoms occur.   EF 30-35%     Zandra Rader [facility] will await decision from CT Surg re: SAVR vs TAVR for severe AS
Echo completed at the bedside. Cardiology notified of preliminary findings.
[facility]  Internal Medicine Teaching Residency Program  Inpatient Daily Progress Note  ______________________________________________________________________________    Patient: Codi Wharton  YOB: 1955   IWR:[id number]    Acct: [de-identified]     Room: 2001/2001-01  Admit date: 3/24/2023  Today's date: 03/29/23  Number of days in the hospital: 5    SUBJECTIVE   Admitting Diagnosis: NSTEMI (non-ST elevated myocardial infarction) ([facility] [address]  CC: Exertional shortness of breath, chest pain  Pt examined at bedside. Chart & results reviewed. -Seen and examined at bedside. Hemodynamically stable. This morning patient had a softer BP of 91/68. HR of 69. Last echo on 3/24/2023 showed LVEF of 30 to 35%, global hypokinesis, grade 2 left ventricular diastolic dysfunction, mild MS, mild MR, severely calcified aortic valve. Cardiac catheterization showed LAD 40% stenosis. Echo showed mean aortic valve gradient 80. CT surgery consulted. ROS:  Constitutional:  negative for chills, fevers, sweats  Respiratory:  negative for cough, dyspnea on exertion, hemoptysis, shortness of breath, wheezing  Cardiovascular:  negative for chest pain, chest pressure/discomfort, lower extremity edema, palpitations  Gastrointestinal:  negative for abdominal pain, constipation, diarrhea, nausea, vomiting  Neurological:  negative for dizziness, headache    BRIEF HISTORY   The patient is a pleasant 78 y.o. female presents with past medical history significant for essential hypertension on losartan presented to the ED with abnormal lab evaluation in outpatient PCPs office and shortness of breath, chest pain. Patient said for the last 7-month patient is having on and off shortness of breath, chest pain upon minimal exertion. She cannot lay flat and uses 2-3 pillow during the night to have a good night sleep.   Upon questioning patient stated she can barely walk 15 to 20 m without getting short of
[facility]   Progress Note                   Date:   4/5/2023  Patient name: Codi Wharton  Date of admission:  3/24/2023  2:43 PM  MRN:   [id number]  YOB: 1955  PCP: Pcp No    Reason for Admission:      Subjective: There were no acute events overnight, remained hemodynamically stable, denies chest pain, dyspnea, orthopnea or palpitations. Medications:   Scheduled Meds:   sodium chloride flush  5-40 mL IntraVENous 2 times per day    aspirin  81 mg Oral Daily    clopidogrel  75 mg Oral Daily    amiodarone  200 mg Oral TID    mupirocin   Each Nostril BID    polyethylene glycol  17 g Oral Daily    sennosides-docusate sodium  1 tablet Oral BID    metoprolol tartrate  12.5 mg Oral BID    atorvastatin  20 mg Oral Nightly    pantoprazole  40 mg Oral Daily    pantoprazole (PROTONIX) 40 mg injection  40 mg IntraVENous Daily    ceFAZolin (ANCEF) IVPB  2,000 mg IntraVENous Q8H    vancomycin (VANCOCIN) IV  1,000 mg IntraVENous Q12H    insulin glargine  0.15 Units/kg SubCUTAneous Nightly    insulin lispro  0-6 Units SubCUTAneous TID WC    insulin lispro  0-3 Units SubCUTAneous Nightly       Continuous Infusions:   sodium chloride 50 mL/hr at 04/05/23 0552    sodium chloride Stopped (04/05/23 0510)    propofol Stopped (04/04/23 1616)    norepinephrine Stopped (04/04/23 2210)    EPINEPHrine Stopped (04/04/23 2316)    insulin Stopped (04/04/23 1450)    dextrose         CBC:   Recent Labs     04/04/23  0443 04/04/23  1357 04/05/23  0407   WBC 5.6 7.6 7.8   HGB 13.0 9.9* 8.4*    See Reflexed IPF Result 81*     BMP:    Recent Labs     04/04/23  1357 04/04/23  2127 04/05/23  0407    144 143   K 3.9 4.1 4.3   * 114* 113*   CO2 21 23 23   BUN 13 11 12   CREATININE 0.67 0.75 0.75   GLUCOSE 102* 149* 144*     Hepatic: No results for input(s): AST, ALT, ALB, BILITOT, ALKPHOS in the last 72 hours. Troponin: No results for input(s): TROPONINI in the last 72 hours.   BNP: No results for
[facility]  Occupational Therapy Not Seen Note    DATE: 3/29/2023    NAME: Jamir García  MRN: [id number]   : 1955      Patient not seen this date for Occupational Therapy due to: Other: Spoke with pt daughter who states mother has no needs for therapy and requests to defer therapy evaluation at this time. Will defer per family wishes. Please re-order OT if future need arises.        Electronically signed by RUSSELL Fofana on 3/29/2023 at 9:21 AM
[facility]  Progress Note                   Date:   3/26/2023  Patient name: Jey Cisneros  Date of admission:  3/24/2023  2:43 PM  MRN:   [id number]  YOB: 1955  PCP: Pcp No    Reason for Admission: NSTEMI (non-ST elevated myocardial infarction) ([facility] [address] [I21.4]    Subjective:       Clinical Changes /Abnormalities: Seen & examine din room with family member (interpreting). Stable. No acute CV issues/concerns overnight. Labs, vitals, & tele reviewed. Review of Systems    Medications:   Scheduled Meds:   furosemide  20 mg IntraVENous BID    sodium chloride flush  5-40 mL IntraVENous 2 times per day    aspirin  81 mg Oral Daily    atorvastatin  40 mg Oral Daily     Continuous Infusions:   heparin (PORCINE) Infusion 8 Units/kg/hr (03/25/23 0122)    sodium chloride       CBC:   Recent Labs     03/24/23  1457 03/25/23  0536 03/26/23  0533   WBC 5.5 4.6 4.9   HGB 13.5 12.7 14.0    186 189     BMP:    Recent Labs     03/24/23  1457 03/25/23  0536 03/26/23  0533    134* 134*   K 4.2 3.8 3.6*    104 100   CO2 23 19* 21   BUN 29* 24* 26*   CREATININE 0.93* 0.70 0.97*   GLUCOSE 103* 90 95     Hepatic:No results for input(s): AST, ALT, ALB, BILITOT, ALKPHOS in the last 72 hours. Troponin:   Recent Labs     03/24/23  1457 03/24/23  1737 03/24/23 2028   TROPHS 22* 20* 23*     BNP: No results for input(s): BNP in the last 72 hours. Lipids: No results for input(s): CHOL, HDL in the last 72 hours. Invalid input(s): LDLCALCU  INR: No results for input(s): INR in the last 72 hours. Objective:   Vitals: /63   Pulse 82   Temp 97.3 °F (36.3 °C)   Resp (!) 1   Wt 147 lb 4.3 oz (66.8 kg)   SpO2 93%   General appearance: alert and cooperative with exam  HEENT: Head: Normocephalic, no lesions, without obvious abnormality.   Neck:no JVD, trachea midline, no adenopathy  Lungs: course to auscultation throughout, rales right > left, on RA without distress lying in bed  Heart:
Multiple calls made to Echo over the course of the day. No answer.      Electronically signed by Karl Garcia RN on 3/25/2023 at 5:13 PM
[facility]  Progress Note                   Date:   3/30/2023  Patient name: Pamela Carter  Date of admission:  3/24/2023  2:43 PM  MRN:   [id number]  YOB: 1955  PCP: Pcp No    Reason for Admission: NSTEMI (non-ST elevated myocardial infarction) ([facility] [address] [I21.4]    Subjective:       Clinical Changes /Abnormalities: Patient seen and examined with family member. No acute CV issues/concerns overnight. No post cath site issues. Tele/vitals/labs reviewed . Review of Systems    Medications:   Scheduled Meds:   furosemide  20 mg IntraVENous Daily    sodium chloride flush  5-40 mL IntraVENous 2 times per day    enoxaparin  40 mg SubCUTAneous Daily    metoprolol tartrate  12.5 mg Oral BID    aspirin  81 mg Oral Daily    atorvastatin  40 mg Oral Daily     Continuous Infusions:   sodium chloride       CBC:   Recent Labs     03/28/23  0730 03/29/23  0527 03/30/23  0518   WBC 6.1 5.4 7.3   HGB 14.6 13.7 13.3    176 190       BMP:    Recent Labs     03/28/23  0730 03/29/23  0527 03/30/23  0518    138 134*   K 4.9 4.2 4.5    104 105   CO2 23 23 19*   BUN 26* 30* 25*   CREATININE 1.00* 1.03* 0.85   GLUCOSE 94 88 94       Hepatic:No results for input(s): AST, ALT, ALB, BILITOT, ALKPHOS in the last 72 hours. Troponin:   No results for input(s): TROPHS in the last 72 hours. BNP: No results for input(s): BNP in the last 72 hours. Lipids: No results for input(s): CHOL, HDL in the last 72 hours. Invalid input(s): LDLCALCU  INR: No results for input(s): INR in the last 72 hours. Objective:   Vitals: /74   Pulse 83   Temp 97.9 °F (36.6 °C) (Oral)   Resp 16   Wt 146 lb 13.2 oz (66.6 kg)   SpO2 95%   General appearance: alert and cooperative with exam  HEENT: Head: Normocephalic, no lesions, without obvious abnormality.   Neck:no JVD, trachea midline, no adenopathy  Lungs: course to auscultation throughout, rales right > left, on RA without distress lying in bed  Heart:
[facility] Cardiothoracic Surgical   Progress Note    4/7/2023 10:09 AM  Surgeon:  Eddi Tamez          POD# 3    S/P : AVR tissue    Subjective:  Ms. Dylan Rosas  seen and examined  Pt shows no distress resting in chair. CP removed Yday. Vital Signs: /81   Pulse 76   Temp 97.9 °F (36.6 °C) (Oral)   Resp 20   Ht 5' 3\" (1.6 m)   Wt 166 lb 7.2 oz (75.5 kg)   SpO2 95%   BMI 29.48 kg/m²  O2 Flow Rate (L/min): 0 L/min   Admit Weight: Weight: 142 lb (64.4 kg)     Chest: pacing wires: yes, chest tubes:yes, air leak no  CV: 1/6 mid systolic low pitched murmur aortic area, Normal S1, S2, NSR  Lungs: diminished, poor effort  Abd: The abdomen is soft without tenderness, guarding, mass, rebound or organomegaly. Bowel sounds are hypoactive bowel sounds   Lower Extremities: Trace edema  Sternal Incison: covered  CXR-stable CXR with no pneumothorax. Pt shows no complications. Labs and Studies:  CBC:   Recent Labs     04/05/23 0407 04/06/23 0218 04/07/23  0623   WBC 7.8 11.0 11.7*   HGB 8.4* 9.0* 9.6*   HCT 26.7* 27.7* 29.5*   MCV 93.4 90.2 89.9   PLT 81* See Reflexed IPF Result 83*     BMP:   Recent Labs     04/05/23  0407 04/06/23 0218 04/07/23  0623    141 140   K 4.3 4.8 3.9   * 111* 107   CO2 23 18* 24   BUN 12 13 18   CREATININE 0.75 0.60 0.61     PT/INR:   Recent Labs     04/05/23  0407 04/06/23 0218 04/07/23  0623   PROTIME 16.0* 18.0* 31.8*   INR 1.3 1.5 3.1     APTT:   Recent Labs     04/04/23  1357   APTT 50.9*     I/O:  I/O last 3 completed shifts: In: 150 [P.O.:150]  Out: 2600 [Urine:2300;  Chest Tube:300]      Scheduled Meds:    furosemide  20 mg IntraVENous BID    losartan  25 mg Oral Daily    metoprolol tartrate  25 mg Oral BID    warfarin placeholder: dosing by pharmacy   Other RX Placeholder    spironolactone  12.5 mg Oral Daily    sodium chloride flush  5-40 mL IntraVENous 2 times per day    aspirin  81 mg Oral Daily    amiodarone  200 mg Oral TID    mupirocin   Each Nostril BID
Order obtained for extubation. SpO2 of 99 on 40% FiO2. Patient extubated and placed on 3 liters/min via nasal cannula. Post extubation SpO2 is 100% with HR  80 bpm and RR 20 breaths/min. Patient had mild cough that was non-productive. Extubation Well tolerated by patient. .   Breath Sounds: clear    Trace Toure RCP   11:17 PM
PTT therapeutic at 57.2. No change or bolus to heparin gtt. Next PTT entered for tomorrow morning.      Electronically signed by Dede Carrillo RN on 3/28/2023 at 8:13 AM
Patient had heart cath procedure on 3/28 via R femoral. Patient new complaints of pain and tenderness at site. Writer assessed site, small lump formation noted, writer marked site. No bruising at this time and hgb stable. Patient has been ambulating since procedure more frequently in room, writer told patient to only ambulate when needing the bathroom for now. Will continue to monitor site.
Patient in room from [place] [id number] at noted time with Anesthesiologist, CRNA, Perfusionist, and OR RN. Patient connected to cardiac bedside monitor and vitals reviewed. Bedside report given from CRNA to writer Aayush Segovia RN).      Electronically signed by Billy Goldmann, RN
Patient refused to get up in chair today. Offered patient to take a walk around the unit and she declined at this time.      Electronically signed by Luli Zapien RN on 3/25/2023 at 4:28 PM
[facility]  Progress Note                   Date:   4/1/2023  Patient name: Arabella Rogers  Date of admission:  3/24/2023  2:43 PM  MRN:   [id number]  YOB: 1955  PCP: Pcp No    Reason for Admission: NSTEMI (non-ST elevated myocardial infarction) ([facility] [address] [I21.4]    Subjective:       Clinical Changes /Abnormalities: Patient seen and examined. Denies chest pain or shortness of breath. Tele/vitals/labs reviewed . Discussed case with RN. Review of Systems    Medications:   Scheduled Meds:   furosemide  20 mg Oral Daily    sodium chloride flush  5-40 mL IntraVENous 2 times per day    enoxaparin  40 mg SubCUTAneous Daily    metoprolol tartrate  12.5 mg Oral BID    aspirin  81 mg Oral Daily    atorvastatin  40 mg Oral Daily     Continuous Infusions:   sodium chloride       CBC:   Recent Labs     03/30/23  0518 03/31/23  0638   WBC 7.3 6.0   HGB 13.3 12.8    155       BMP:    Recent Labs     03/30/23  0518 03/31/23  0638 04/01/23  0946   * 135 136   K 4.5 4.2 4.2    105 102   CO2 19* 20 22   BUN 25* 19 16   CREATININE 0.85 0.78 0.91*   GLUCOSE 94 88 138*       Hepatic:No results for input(s): AST, ALT, ALB, BILITOT, ALKPHOS in the last 72 hours. Troponin:   No results for input(s): TROPHS in the last 72 hours. BNP: No results for input(s): BNP in the last 72 hours. Lipids: No results for input(s): CHOL, HDL in the last 72 hours. Invalid input(s): LDLCALCU  INR: No results for input(s): INR in the last 72 hours. Objective:   Vitals: /70   Pulse 76   Temp 97.9 °F (36.6 °C) (Oral)   Resp 14   Wt 146 lb 13.2 oz (66.6 kg)   SpO2 95%   General appearance: alert and cooperative with exam  HEENT: Head: Normocephalic, no lesions, without obvious abnormality.   Neck:no JVD, trachea midline, no adenopathy  Lungs: course to auscultation throughout, rales right > left, on RA without distress lying in bed  Heart: Regular rate and rhythm, s1/s2 auscultated, + murmurs,
Pharmacy Note  Warfarin Consult    Sarahy Werner is a 78 y.o. female for whom pharmacy has been consulted to manage warfarin therapy. Consulting Physician: Miguel BALDWIN    Warfarin dose prior to admission: n/a, new start  Warfarin indication: sp AVR  Target INR range: 2-3    History reviewed. No pertinent past medical history. Recent Labs     04/05/23  0407   INR 1.3     Recent Labs     04/04/23  0443 04/04/23  1357 04/05/23  0407   HGB 13.0 9.9* 8.4*   HCT 39.5 29.4* 26.7*    See Reflexed IPF Result 81*       Current warfarin drug-drug interactions: Amiodarone, aspirin    Date             INR        Dose   4/5/2023          1.3      5 mg ordered    We will give an initial dose of warfarin 5 mg x1. Patient will likely need lower maintenance dose due drug interaction with amiodarone. Daily PT/INR while inpatient. Thank you for the consult. Will continue to follow.     Madan Boggs, PharmD, BCCCP 4/5/2023 1:25 PM
Pharmacy Note  Warfarin Consult follow-up      Recent Labs     04/07/23  0623   INR 3.1     Recent Labs     04/05/23  0407 04/06/23  0218 04/07/23  0623   HGB 8.4* 9.0* 9.6*   HCT 26.7* 27.7* 29.5*   PLT 81* See Reflexed IPF Result 83*       Significant Drug-Drug Interactions:  New warfarin drug-drug interactions: none  Discontinued drug-drug interactions: none      Notes:                   Due to large jump in INR (1.5 to 3.1) will hold warfarin dose for today and reevaluate tomorrow. Daily PT/INR while inpatient.     Dion Frank, PharmD, BCPS  4/7/2023  1:29 PM
Pharmacy Note  Warfarin Consult follow-up      Recent Labs     04/08/23  0627   INR 2.6     Recent Labs     04/06/23  0218 04/07/23  0623 04/08/23  0627   HGB 9.0* 9.6* 8.6*   HCT 27.7* 29.5* 27.2*   PLT See Reflexed IPF Result 83* 163       Significant Drug-Drug Interactions:  New warfarin drug-drug interactions: none  Discontinued drug-drug interactions: none      Notes:                     INR now in goal range of 2-3. Will give 2.5 mg x 1 dose today. Daily PT/INR while inpatient.     Dora Jacinto, PharmD, [facility]  4/8/2023 12:44 PM
Pharmacy Note  Warfarin Consult follow-up    Warfarin dose prior to admission: n/a, new start  Warfarin indication: bioprosthetic aortic valve replacement  Target INR range: 2-3     Recent Labs     04/06/23  0218   INR 1.5     Recent Labs     04/04/23  1357 04/05/23  0407 04/06/23  0218   HGB 9.9* 8.4* 9.0*   HCT 29.4* 26.7* 27.7*   PLT See Reflexed IPF Result 81* See Reflexed IPF Result       Current warfarin drug-drug interactions: aspirin, amiodarone     Date INR Dose   4/3 1.0 -   4/4 1.8 -   4/5 1.3 5 mg   4/6 1.5       Notes:  - INR subtherapeutic but trending up  - Will order Warfarin 2.5 mg x1 for today in anticipation of further INR increase from the initial dose                Daily PT/INR while inpatient.      Rahul Juan, PharmD, 4/6/2023 11:27 AM
Physical Therapy  Facility/Department: [facility] CAR 2- STEPDOWN  Daily Treatment Note    Name: Giorgio Summers  : 1955  MRN: [id number]  Date of Service: 2023    Discharge Recommendations:  Patient would benefit from continued therapy after discharge   PT Equipment Recommendations  Equipment Needed: Yes  Mobility Devices: Nanine Mark: Rolling      Patient Diagnosis(es): The primary encounter diagnosis was NSTEMI (non-ST elevated myocardial infarction) ([facility] [address] A diagnosis of Aortic stenosis, severe was also pertinent to this visit. Past Medical History:  has no past medical history on file. Past Surgical History:  has a past surgical history that includes Aortic valve repair (N/A, 2023). Assessment   Body Structures, Functions, Activity Limitations Requiring Skilled Therapeutic Intervention: Decreased functional mobility ; Decreased endurance;Decreased strength;Decreased balance    Assessment: Pt ambulated 200 ft with RW CGA. PT unsafe to return to prior living arrangements without assistance. Recommend continued PT after d/c to address deficits.   Specific Instructions for Next Treatment: STAIR TRAINING  Therapy Prognosis: Good  Activity Tolerance  Activity Tolerance: Patient tolerated treatment well;Patient limited by fatigue  Activity Tolerance Comments: Pt expressed fatigue after ambulation     Plan   Physcial Therapy Plan  General Plan: 6-7 times per week  Specific Instructions for Next Treatment: STAIR TRAINING  Current Treatment Recommendations: Strengthening, Balance training, Functional mobility training, Transfer training, Endurance training, Gait training, Stair training, Neuromuscular re-education, Home exercise program, Safety education & training, Patient/Caregiver education & training, Equipment evaluation, education, & procurement, Therapeutic activities  Safety Devices  Type of Devices: Gait belt, Nurse notified, Patient at risk for falls, Call light within reach, Left in chair, All fall risk
Physical Therapy  Facility/Department: [facility] CAR 1- SICU  Physical Therapy daily treatment note    Name: Tala Guevara  : 1955  MRN: [id number]  Date of Service: 2023    Discharge Recommendations:  Patient would benefit from continued therapy after discharge   PT Equipment Recommendations  Equipment Needed: No      Patient Diagnosis(es): The primary encounter diagnosis was NSTEMI (non-ST elevated myocardial infarction) ([facility] [address] A diagnosis of Aortic stenosis, severe was also pertinent to this visit. Past Medical History:  has no past medical history on file. Past Surgical History:  has a past surgical history that includes Aortic valve repair (N/A, 2023). Assessment   Body Structures, Functions, Activity Limitations Requiring Skilled Therapeutic Intervention: Decreased functional mobility ; Decreased endurance;Decreased strength;Decreased balance  Assessment: Pt amb 50 ft x2 with RW and CGA for safety.  PT unsafe to return to prior living arrangements,recommend contiuned PT after d/c to address deficits  Therapy Prognosis: Good  Activity Tolerance  Activity Tolerance: Patient tolerated treatment well     Plan   Physcial Therapy Plan  General Plan: 6-7 times per week  Current Treatment Recommendations: Strengthening, Balance training, Functional mobility training, Transfer training, Endurance training, Gait training, Stair training, Neuromuscular re-education, Home exercise program, Safety education & training, Patient/Caregiver education & training, Equipment evaluation, education, & procurement, Therapeutic activities  Safety Devices  Type of Devices: Gait belt, Nurse notified, Patient at risk for falls, Call light within reach, Left in chair, All fall risk precautions in place  Restraints  Restraints Initially in Place: No     Restrictions  Restrictions/Precautions  Restrictions/Precautions: Up as Tolerated, Cardiac, General Precautions  Required Braces or Orthoses?: Yes  Required Braces or Orthoses  Other:
Physical Therapy Cancel Note      DATE: 3/27/2023    NAME: Olivia Pavon  MRN: [id number]   : 1955      Patient not seen this date for Physical Therapy due to:    Patient independent with functional mobility. Will defer PT evaluation at this time. Please reorder PT if future needs arise. Per dtr, pt is at her baseline level of independence, and she doesn't feel the pt needs PT. Educated dtr in importance of pt being OOB/up to chair--she states she'll have her eat her supper up in the chair. Defer PT eval per dtr's wishes d/t pt independence (pt doesn't speak English--visiting from [place], plan is for pt to return to her dtr's home at [facility]).        Electronically signed by Isaiah Romero PT on 3/27/2023 at 1:48 PM  2
Physician Progress Note      PATIENTMillie Velazquez  CSN #:                  [id number]  :                       1955  ADMIT DATE:       3/24/2023 2:43 PM  [address] [place] DATE:  RESPONDING  PROVIDER #:        Darrian Carroll          QUERY TEXT:    Patient admitted with NSTEMI. Noted documentation of New onset CHF, systolic   & diastolic by Cardiology Consultant in all of their notes and New onset CHF   with LVEF 35% (No Type listed). If possible, please document in progress notes and discharge summary if you   are evaluating and /or treating any of the following: The medical record reflects the following:  Risk Factors: CAD, HTN, NICMP  Clinical Indicators: Echo: Aortic stenosis: Heavily calcified aortic valve   with critical stenosis. Plan for AVR with root enlargement on  with Dr. Deshawn Chadwick. CXR 2vw: Cardiomegaly, otherwise, no acute cardiopulmonary   process, IV Lasix  Treatment: Lasix 20 mg IV BID and QD then PO daily. I & Os and Daily wts. watch Vital signs. Thank you for your time, Jose Rice MSN, RN CDS. Please call/text/PS with any   questions; [phone]. Options provided:  -- New onset systolic & diastolic congestive heart failure confirmed and New   onset CHF ruled out  -- Acute Systolic CHF only confirmed and New onset Congestive heart failure,   systolic & diastolic ruled out  -- Other - I will add my own diagnosis  -- Disagree - Not applicable / Not valid  -- Disagree - Clinically unable to determine / Unknown  -- Refer to Clinical Documentation Reviewer    PROVIDER RESPONSE TEXT:    After study, New onset Congestive heart failure, systolic & diastolic   confirmed.     Query created by: Ana Luna on 4/3/2023 5:33 PM      Electronically signed by:  Darrian Carroll 2023 2:50 PM
SPIRITUAL CARE DEPARTMENT - [address]  PROGRESS NOTE    Shift date: 4.5.2023  Shift day: Wednesday   Shift # 2    Room # 1012/1012-01   Name: Floyd Padilla                Presybeterian: unknown   Place of Tenriism: unknown    Referral: Post-Surgery Consult    Admit Date & Time: 3/24/2023  2:43 PM    Assessment:  Floyd Padilla is a 78 y.o. female in the hospital because of heart surgery. Upon entering the room writer observes patient resting with daughter bedside. Daughter appears to be coping but concerned about her mother's visa issues as the patient is from [facility]. She is asking for a letter or something to show that the patient is in the hospital and unable to fly. Also, the daughter is very concerned about the cost of the medical care and needs help. Intervention:  Writer introduced self and title as  Writer offered space for the daughter  to express feelings, needs, and concerns and provided a ministry presence. Outcome:  Daughter expressed gratitude. Plan:  Chaplains will remain available to offer spiritual and emotional support as needed. Electronically signed by Liliam Charles on 4/5/2023 at 9:19 PM.  [facility]  [phone]       04/05/23 2045   Encounter Summary   Service Provided For: Family   Referral/Consult From: Physician   Support System Children   Last Encounter  04/05/23   Complexity of Encounter Moderate   Begin Time 2045   End Time  2110   Total Time Calculated 25 min   Encounter    Type Follow up   Assessment/Intervention/Outcome   Assessment Calm;Coping; Anxious   Intervention Active listening;Discussed illness injury and its impact; Explored/Affirmed feelings, thoughts, concerns   Outcome Engaged in conversation;Expressed feelings, needs, and concerns     Electronically signed by Jesus Nunes on 4/5/2023 at 9:19 PM
(ANCEF) IVPB  2,000 mg IntraVENous Q8H    vancomycin (VANCOCIN) IV  1,000 mg IntraVENous Q12H    insulin glargine  0.15 Units/kg SubCUTAneous Nightly    insulin lispro  0-6 Units SubCUTAneous TID WC    insulin lispro  0-3 Units SubCUTAneous Nightly     Continuous Infusions:    sodium chloride 50 mL/hr at 04/05/23 0552    sodium chloride Stopped (04/05/23 0510)    propofol Stopped (04/04/23 1616)    norepinephrine Stopped (04/04/23 2210)    EPINEPHrine Stopped (04/04/23 2316)    insulin Stopped (04/04/23 1450)    dextrose       Assessment/Plan:   S/P AVR on 4/5/23. Doing well off pressors. Start beta 25mg bid. Pulmonary expected post op findings. IS/PT/Ambulate. GI advance cardiac diet. ABLA 8.4. D/C art line and paredes this morning.  Start coumadin dosed by pharmacy    NICOLASA Lopez
72 hours. Invalid input(s): LDLCALCU  INR:   Recent Labs     04/05/23  0407 04/06/23  0218 04/07/23  0623   INR 1.3 1.5 3.1         Objective:   Vitals: /81   Pulse 76   Temp 97.9 °F (36.6 °C) (Oral)   Resp 20   Ht 5' 3\" (1.6 m)   Wt 166 lb 7.2 oz (75.5 kg)   SpO2 95%   BMI 29.48 kg/m²     General appearance: awake, alert, in no apparent respiratory distress   HEENT: Head: Normocephalic, no lesions, without obvious abnormality  Neck: no JVD  Lungs: clear to auscultation bilaterally, no basilar rales, no wheezing   Heart: regular rate and rhythm, S1, S2 normal, no murmur, click, rub or gallop  Abdomen: soft, non-tender; bowel sounds normal  Extremities: No LE edema  Neurologic: Mental status: Alert, oriented. Motor and sensory not done. Cardiac Cath 3/28/23   Angiographic Findings        Cardiac Arteries and Lesion Findings       LMCA: Normal 0% stenosis. LAD: has proximal 40% stenosis. The D1 is small and is normal. The D2 is   normal.     LCx: Normal 0% stenosis. The OM1 is normal. The OM2 has 20% stenosis. RCA: has high anterior origin with proximal 20% stenosis. Coronary Tree        Dominance: Right       LV Analysis   LV function assessed as:Abnormal.   Ejection Fraction   +----------------------------------------------------------------------+---+   ! Method                                                                ! EF%! +----------------------------------------------------------------------+---+   ! LV gram                                                               !30 !   +----------------------------------------------------------------------+---+      Procedure Summary        Normal biventricular filling pressures. Low cardiac output. Critical aortic stenosis. Moderate LV systolic dysfunction; LVEF 30%. Moderate disease of proximal LAD. 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 Recommendations        Medical therapy as needed.     Risk factor
Hope;Sustaining Presence/Ministry of presence   Outcome Comfort;Coping;Receptive   Plan and Referrals   Plan/Referrals Continue to visit, (comment)  (as needed)     Electronically signed by Marc Mitchell, on 3/28/2023 at 12:14 AM.  [facility]  [phone]
Nutrition Interventions:   Food and/or Nutrient Delivery: Continue NPO  Nutrition Education/Counseling: No recommendation at this time  Coordination of Nutrition Care: Continue to monitor while inpatient  Plan of Care discussed with: Daughter    Goals:  Previous Goal Met:  (Goal set)  Goals: Meet at least 75% of estimated needs, prior to discharge, Initiate PO diet     Nutrition Monitoring and Evaluation:   Behavioral-Environmental Outcomes: None Identified  Food/Nutrient Intake Outcomes: Food and Nutrient Intake  Physical Signs/Symptoms Outcomes: Biochemical Data, Nutrition Focused Physical Findings, Weight    Discharge Planning:     Too soon to determine     Donald Durán RD  Contact: [phone]
adenopathy  Lungs: course to auscultation throughout, rales right > left, on RA without distress lying in bed  Heart: Regular rate and rhythm, s1/s2 auscultated, + murmurs, SR 82 occ PACs  Abdomen: soft, non-tender, bowel sounds active  Extremities: trace ankle edema  Neurologic: not done        Assessment / Acute Cardiac Problems:   Recurrent anginal chest pain with NSTEMI on presentation   New onset Congestive heart failure, 2D ECHO pending   ? Concern for aortic stenosis   HTN    Patient Active Problem List:     NSTEMI (non-ST elevated myocardial infarction) ([facility] [address]      Plan of Treatment:   Stable. . BP soft  no ACE/ARB at this time. Accurate I&O and daily weight  Continue IV Heparin gtt. Will add patient for left / heart cath per Dr. Olguin Pea consult note   I have discussed risks (including but not limited to vascular injury, infection, hematoma, contrast induced kidney dysfunction, CVA and MI), benefits, alternatives in detail. All questions answered. Patient /daughter agrees to proceed.      consent in chart    Keep K >4 and mg >2    Electronically signed by SHERRI Churchill NP on 3/28/2023 at 11:53 AM  [address]  [phone]
adenopathy  Lungs: course to auscultation throughout, rales right > left, on RA without distress lying in bed  Heart: Regular rate and rhythm, s1/s2 auscultated, + murmurs, SR 82 occ PACs  Abdomen: soft, non-tender, bowel sounds active  Extremities: trace ankle edema  Neurologic: not done        Assessment / Acute Cardiac Problems:   Recurrent anginal chest pain with NSTEMI on presentation   New onset Congestive heart failure, 2D ECHO pending   ? Concern for aortic stenosis   HTN    Patient Active Problem List:     NSTEMI (non-ST elevated myocardial infarction) ([facility] [address]      Plan of Treatment:   Stable. Continue IV diuresis . BP soft  no ACE/ARB at this time. Accurate I&O and daily weight  Continue IV Heparin gtt.  Will plan for cath after adequately diuresed and echo completed, nitro as needed for chest pain   Keep K >4 and mg >2    Electronically signed by SHERRI Ramos NP on 3/27/2023 at 11:36 AM  [address]  [phone]
during the night to have a good night sleep. Upon questioning patient stated she can barely walk 15 to 20 m without getting short of breath. But for the last 2 days her shortness of breath and chest pain was significantly increased and for that she went to a local outpatient facility and found to have elevated troponin and was referred to the ED. Upon ED evaluation she was found to have a troponin level of 22, proBNP 3234, EKG at the ED showing inverted T wave in V3, V4 and mild subtle ST elevation in aVR      OBJECTIVE     Vital Signs:  BP (!) 114/59   Pulse 75   Temp 98.1 °F (36.7 °C) (Oral)   Resp 16   Wt 146 lb 13.2 oz (66.6 kg)   SpO2 95%     Temp (24hrs), Av °F (36.7 °C), Min:97.5 °F (36.4 °C), Max:98.4 °F (36.9 °C)    In: 10   Out: -     Physical Exam:  Constitutional:       Appearance: Normal appearance. HENT:      Head: Normocephalic and atraumatic. Cardiovascular:      Rate and Rhythm: Normal rate and regular rhythm. Pulses: Normal pulses. Heart sounds: Normal heart sounds. No murmur heard. No friction rub. Pulmonary:      Effort: No respiratory distress. Breath sounds: No stridor. Comments: Ejection systolic murmur in the aortic area with radiation to the internal carotid artery was auscultated upon chest auscultation. .  Abdominal:      General: Abdomen is flat. Bowel sounds are normal. There is no distension. Palpations: There is no mass. Musculoskeletal:      Cervical back: Normal range of motion and neck supple. No rigidity or tenderness. Skin:     Coloration: Skin is not jaundiced or pale. Neurological:      General: No focal deficit present. Mental Status: She is alert and oriented to person, place, and time. Cranial Nerves: No cranial nerve deficit. Sensory: No sensory deficit.        Medications:  Scheduled Medications:    furosemide  20 mg Oral Daily    sodium chloride flush  5-40 mL IntraVENous 2 times per day    enoxaparin  40 mg
troponin and was referred to the ED. Upon ED evaluation she was found to have a troponin level of 22, proBNP 3234, EKG at the ED showing inverted T wave in V3, V4 and mild subtle ST elevation in aVR      OBJECTIVE     Vital Signs:  /67   Pulse 74   Temp 97.9 °F (36.6 °C) (Oral)   Resp 16   Wt 146 lb 13.2 oz (66.6 kg)   SpO2 96%     Temp (24hrs), Av °F (36.7 °C), Min:97.5 °F (36.4 °C), Max:98.4 °F (36.9 °C)    No intake/output data recorded. Physical Exam:  Constitutional:       Appearance: Normal appearance. HENT:      Head: Normocephalic and atraumatic. Cardiovascular:      Rate and Rhythm: Normal rate and regular rhythm. Pulses: Normal pulses. Heart sounds: Normal heart sounds. No murmur heard. No friction rub. Pulmonary:      Effort: No respiratory distress. Breath sounds: No stridor. Comments: Ejection systolic murmur in the aortic area with radiation to the internal carotid artery was auscultated upon chest auscultation. .  Abdominal:      General: Abdomen is flat. Bowel sounds are normal. There is no distension. Palpations: There is no mass. Musculoskeletal:      Cervical back: Normal range of motion and neck supple. No rigidity or tenderness. Skin:     Coloration: Skin is not jaundiced or pale. Neurological:      General: No focal deficit present. Mental Status: She is alert and oriented to person, place, and time. Cranial Nerves: No cranial nerve deficit. Sensory: No sensory deficit.        Medications:  Scheduled Medications:    furosemide  20 mg Oral Daily    sodium chloride flush  5-40 mL IntraVENous 2 times per day    enoxaparin  40 mg SubCUTAneous Daily    metoprolol tartrate  12.5 mg Oral BID    aspirin  81 mg Oral Daily    atorvastatin  40 mg Oral Daily     Continuous Infusions:    sodium chloride       PRN Medicationssodium chloride flush, 5-40 mL, PRN  sodium chloride, , PRN  nitroGLYCERIN, 0.4 mg, Q5 Min PRN  ondansetron, 4 mg,
troponin and was referred to the ED. Upon ED evaluation she was found to have a troponin level of 22, proBNP 3234, EKG at the ED showing inverted T wave in V3, V4 and mild subtle ST elevation in aVR      OBJECTIVE     Vital Signs:  /67   Pulse 78   Temp 98.6 °F (37 °C) (Oral)   Resp 18   Wt 146 lb 13.2 oz (66.6 kg)   SpO2 95%     Temp (24hrs), Av.3 °F (36.8 °C), Min:98 °F (36.7 °C), Max:98.6 °F (37 °C)    No intake/output data recorded. Physical Exam:  Constitutional:       Appearance: Normal appearance. HENT:      Head: Normocephalic and atraumatic. Cardiovascular:      Rate and Rhythm: Normal rate and regular rhythm. Pulses: Normal pulses. Heart sounds: Normal heart sounds. No murmur heard. No friction rub. Pulmonary:      Effort: No respiratory distress. Breath sounds: No stridor. Comments: Ejection systolic murmur in the aortic area with radiation to the internal carotid artery was auscultated upon chest auscultation. .  Abdominal:      General: Abdomen is flat. Bowel sounds are normal. There is no distension. Palpations: There is no mass. Musculoskeletal:      Cervical back: Normal range of motion and neck supple. No rigidity or tenderness. Skin:     Coloration: Skin is not jaundiced or pale. Neurological:      General: No focal deficit present. Mental Status: She is alert and oriented to person, place, and time. Cranial Nerves: No cranial nerve deficit. Sensory: No sensory deficit.        Medications:  Scheduled Medications:    furosemide  20 mg Oral Daily    sodium chloride flush  5-40 mL IntraVENous 2 times per day    enoxaparin  40 mg SubCUTAneous Daily    metoprolol tartrate  12.5 mg Oral BID    aspirin  81 mg Oral Daily    atorvastatin  40 mg Oral Daily     Continuous Infusions:    sodium chloride       PRN Medicationssodium chloride flush, 5-40 mL, PRN  sodium chloride, , PRN  nitroGLYCERIN, 0.4 mg, Q5 Min PRN  ondansetron, 4 mg, Q8H
mmHg.   No significant pericardial effusion is seen. Assessment / Acute Cardiac Problems:   Recurrent anginal chest pain   Non-obstructive CAD on cath as above  Critical Aortic stenosis  New onset Congestive heart failure, systolic & diastolic  NICMP   HTN    Patient Active Problem List:     NSTEMI (non-ST elevated myocardial infarction) ([facility] [facility] [address]      Plan of Treatment:   Stable. Cath as above  Continue PO ASA & statin along with BB  Nonischemic cardiomyopathy with EF 30-35% -BP soft, ACE on hold  . Clinically no volume overload on exam. Continue lasix with close monitoring  on renal function    CTS on board  for possible SAVR versus TAVR-  waiting for preop studies.  Consulting structural heart     Electronically signed by SHERRI García CNP on 3/31/2023 at [address] [facility] 9. [phone]
modification. CV surgical consultation for SAVR. Echo 3/28/23  Summary   Left ventricle is normal in size, global left ventricular systolic function   is moderately reduced, estimated LVEF 30-35%, global hypokinesia noted. Grade II (moderate) left ventricular diastolic dysfunction. Normal right ventricular size and function. Heavily calcified aortic valve with critical stenosis (Peak velocity 5.3   m/sec, mean gradient 69 mm hg and BORA 0.28 cm2)   Mild aortic insufficiency. Mild mitral regurgitation. Mild tricuspid regurgitation. Estimated right ventricular systolic pressure is 17 mmHg. No significant pericardial effusion is seen. Assessment / Acute Cardiac Problems:   Non ischemic cardiomyopathy with EF 30-35% and DD  Non obstructive CAD on cath   Critical aortic stenosis s/p bioprosthetic AVR 4/4/2023, with a 21-mm Sebastián-Raygoza Inspiris aortic valve. HTN  HLD    Patient Active Problem List:     NSTEMI (non-ST elevated myocardial infarction) ([facility] [address]     Severe aortic stenosis     Acute systolic CHF (congestive heart failure) (HCC)     Nonrheumatic aortic valve stenosis      Plan of Treatment:   Continue amiodarone 200 mg TID as per CTS. Continue ASA 81 mg daily and started on coumadin as per CTS, pharmacy to dose. Continue lipitor 20 mg nightly. Continue lopressor 25 mg BID. Continue losartan 25 mg geovanna  Will switch IV lasix to PO   Lifevest on. Replace electrolytes to keep K>4 and Mg>2. [id number] [address] for d/c from cardiology standpoint     Discussed with patient and nursing.        Dorna Goodell, APRN - CNP
pericardial effusion is seen. Assessment / Acute Cardiac Problems:   Recurrent anginal chest pain   Non-obstructive CAD on cath as above  Critical Aortic stenosis  New onset Congestive heart failure, systolic & diastolic  NICMP   HTN    Patient Active Problem List:     NSTEMI (non-ST elevated myocardial infarction) ([facility] [address]      Plan of Treatment:   Stable. Cath as above  Continue PO ASA & statin along with BB  Nonischemic cardiomyopathy with EF 30-35% -BP soft, ACE on hold  . Clinically no volume overload on exam. Continue lasix with close monitoring  on renal function    CTS on board  for possible SAVR versus TAVR-  waiting for preop studies.  Consulting structural heart     Electronically signed by SHERRI Herndon NP on 4/2/2023 at 10:00 AM  [address]  [phone]
ventricular systolic pressure is 17 mmHg. No significant pericardial effusion is seen. Assessment / Acute Cardiac Problems:   Recurrent anginal chest pain   Non-obstructive CAD on cath as above  Critical Aortic stenosis  New onset Congestive heart failure, systolic & diastolic  NICMP   HTN    Patient Active Problem List:     NSTEMI (non-ST elevated myocardial infarction) ([facility] [address]      Plan of Treatment:   Stable. Cath as above  Continue PO ASA & statin along with BB  BP soft, hold off on ACE and reassess daily.  Clinically no volume overload on exam.   Await CTS recommendations for SAVR    Electronically signed by SHERRI Tyler CNP on 3/29/2023 at 10:33 AM  [address]  [phone]
12.5 mg oral 2 times daily. Aspirin, Lipitor. Will add B blockers to be continued  Acute CHF:On po lasix 20 mg BID, maintan intake/output record, keep negative fluid balance, follow up on Echo, echo on 3/28/2023 showed mean aortic valve gradient of 80 indicating critical aortic stenosis, LVEF 25%. CT surgery consulted   aortic stenosis: Patient have ejection systolic murmur, likely aortic stenosis, follow up on echo, recommendations based on echo results. Echo completed and showed mean aortic valve gradient of 80 with aortic valve surface area 0.33 cm². CT surgery on board for SAVR   DVT ppx :lovenox      PT/OT: Consulted  Discharge Planning / SW:  consulted    Bijan Sultana MD  Internal Medicine Resident, PGY-1  [address] [place], [place]  4/2/2023, 10:33 AM
36.5       CARDIAC ENZYMES: No results for input(s): CKMB, CKMBINDEX, TROPONINI in the last 72 hours. Invalid input(s): CKTOTAL;3  FASTING LIPID PANEL:No results found for: CHOL, HDL, TRIG  LIVER PROFILE: No results for input(s): AST, ALT, ALB, BILIDIR, BILITOT, ALKPHOS in the last 72 hours. MICROBIOLOGY: No results found for: CULTURE    Imaging:    XR CHEST (2 VW)    Result Date: 3/24/2023  Cardiomegaly, otherwise, no acute cardiopulmonary process       ASSESSMENT & PLAN     ASSESSMENT / PLAN:     Principal Problem:  NSTEMI  [facility]): Mildly elevated troponin. EKG consistent with T wave inversion in lead 20 2022. Cardiology consulted. Patient had cardiac catheterization on 3/28, showing 40% proximal LAD stenosis. Recommended medical therapy and risk factor modification. Continue aspirin and beta-blocker. Aortic stenosis: Heavily calcified aortic valve with critical stenosis (Peak velocity 5.3 m/sec, mean gradient 69 mm hg and BORA 0.28 cm2). Plnned to undergo aortic valve replacement later today. Patient have no other medical issuse right now. We will sign off. Lump at right groin: Hematoma versus pseudoaneurysm. Follow-up on groin ultrasound. Acute CHF:On po lasix 20 mg daily, maintan intake/output record, keep negative fluid balance. Echo on 3/28/2023 showed mean aortic valve gradient of 80 indicating critical aortic stenosis, LVEF 35%. DVT ppx :lovenox    PT/OT: Consulted  Discharge Planning / SW:  consulted    Angeles Martinez MD  Internal Medicine Resident, PGY-1  [facility];  [place], [place]  4/4/2023, 1:47 PM
Ailyn Baldwin MD MD  Fellow, [address] [facility]   Pager - [phone]    I performed a history and physical examination of the patient and discussed management with the resident. I reviewed the residents note and agree with the documented findings and plan of care. Any areas of disagreement are noted on the chart. I was personally present for the key portions of any procedures. I have documented in the chart those procedures where I was not present during the key portions. I have personally evaluated this patient and have completed at least one if not all key elements of the E/M (history, physical exam, and MDM). Additional findings are as noted.     Jermaine Flowers MD
No LOB. Distance: 2' Anterior, 2' retro (x2)  Comments: Ambulation distance limited by chest tube to wall suction, portable suction unit unable to be utilized for ambulation. More Ambulation?: No     Balance  Posture: Fair  Sitting - Static: Good;-  Sitting - Dynamic: Good;-  Standing - Static: Fair  Standing - Dynamic: Fair;-  Comments: Standing balance assessed w/ RW         AM-PAC Score  AM-PAC Inpatient Mobility Raw Score : 15 (04/05/23 1009)  AM-PAC Inpatient T-Scale Score : 39.45 (04/05/23 1009)  Mobility Inpatient CMS 0-100% Score: 57.7 (04/05/23 1009)  Mobility Inpatient CMS G-Code Modifier : CK (04/05/23 1009)        Goals  Short Term Goals  Time Frame for Short Term Goals: 14 visits  Short Term Goal 1: Pt will amb 300' Avis w/ RW  Short Term Goal 2: Pt will be Avis in all bed mobility tasks  Short Term Goal 3: Pt will be Avis in transfers  Short Term Goal 4: Pt will be CGA in negotiation of 9 steps w/ R rail use       Education  Patient Education  Education Given To: Patient  Education Provided: Role of Therapy;Plan of Care;Precautions; Equipment  Education Method:   Barriers to Learning: Other (Comment) (Language)  Education Outcome: Verbalized understanding;Demonstrated understanding;Continued education needed      Therapy Time   Individual Concurrent Group Co-treatment   Time In 0810         Time Out 0846         Minutes 36         Timed Code Treatment Minutes: 8 Minutes       CASEY Baker   This treatment/evaluation completed by signing SPT. Signing PT agrees with treatment and documentation.
attempted to don B socks this date seated in recliner, requiring Max A to complete d/t decreased functional reach to distal extremities to complete task. Pt required Max verbal cues for use of heart hugger and adherence to sternal precautions. Bed mobility  Bed Mobility Comments: in recliner  upon arrival, retired to recliner at end of session     Vision  Vision: Within Functional Limits  Hearing  Hearing: Within functional limits  Cognition  Overall Cognitive Status: Exceptions  Arousal/Alertness: Delayed responses to stimuli  Following Commands: Follows one step commands with repetition; Follows one step commands with increased time  Orientation  Orientation Level: Oriented to place;Oriented to person;Oriented to time     Education Given To: Patient  Education Provided: Role of Therapy;Plan of Care;Transfer Training;Equipment; Energy Conservation;Precautions; ADL Adaptive Strategies; Fall Prevention Strategies  Education Method: Verbal;Demonstration  Barriers to Learning: None  Education Outcome: Continued education needed; Unable to demonstrate understanding  AM-PAC Score        AM-[facility] Inpatient Daily Activity Raw Score: 19 (04/05/23 0945)  AM-PAC Inpatient ADL T-Scale Score : 40.22 (04/05/23 0945)  ADL Inpatient CMS 0-100% Score: 42.8 (04/05/23 0945)  ADL Inpatient CMS G-Code Modifier : CK (04/05/23 0945)  Goals  Short Term Goals  Time Frame for Short Term Goals: Patient will, by discharge  Short Term Goal 1: demo UB ADLs at Mod I  Short Term Goal 2: demo LB/toileting tasks at Supervision using AE PRN  Short Term Goal 3: demo functional transfers/mobility using LRAD at Supervision to engage in ADL tasks  Short Term Goal 4: demo 10+ min of dynamic standing tolerance at Supervision using LRAD to engage in ADLs  Short Term Goal 5: demo 100% adherence to sternal precautions with 0 VCs to promote carryover during functional tasks  Short Term Goal 6: demo good family/caregiver understanding of sternal precautions
daily. Aspirin, Lipitor. Will add B blockers to be continued  Acute CHF:On po lasix 20 mg BID, maintan intake/output record, keep negative fluid balance, follow up on Echo, echo on 3/28/2023 showed mean aortic valve gradient of 80 indicating critical aortic stenosis, LVEF 25%. CT surgery consulted   aortic stenosis: Patient have ejection systolic murmur, likely aortic stenosis, follow up on echo, recommendations based on echo results. Echo completed and showed mean aortic valve gradient of 80 with aortic valve surface area 0.33 cm². We will reach out to CT surgery for possible SAVR. DVT ppx :lovenox      PT/OT: Consulted  Discharge Planning / SW:  consulted    Marya Echols MD  Internal Medicine Resident, PGY-1  [address] [place], [place]  3/30/2023, 1:30 PM
more than 35, may be secondary due to severe aortic stenosis  - Cardiology on board  - proBNP elevated on admission, 3234  - Cardiology on board, awaiting recommendations        3. Stable angina:  - Complains of 7-month history of exertional shortness of breath, inability to lie flat  - Can walk only 15 to 21 m, meeting the criteria for NYHA class II  With a CCS goal of class III  - Takes losartan, spironolactone, Lasix as home medication  - Will resume home medication after cardio recommendation               DVT ppx : On heparin drip      PT/OT: Consulted  Discharge Planning / SW: Manager consulted    Masha Flowers MD  Internal Medicine Resident, PGY-1  [facility];  [place], [place]  3/25/2023, 11:04 AM
severe calcific aortic valve causing severe aortic stenosis with mild MR, mild AR. Aortic stenosis: Patient have ejection systolic murmur, likely aortic stenosis, follow up on echo, recommendations based on echo results        DVT ppx : On heparin drip      PT/OT: Consulted  Discharge Planning / SW: Manager consulted    Mariola Bell MD  Internal Medicine Resident, PGY-3  [facility];  [place], [place]  3/26/2023, 6:45 AM
systolic function with an LVEF of 50%, global hypokinesia with severe calcific aortic valve causing severe aortic stenosis with mild MR, mild AR. Aortic stenosis: Patient have ejection systolic murmur, likely aortic stenosis, follow up on echo, recommendations based on echo results        DVT ppx : On heparin drip      PT/OT: Consulted  Discharge Planning / SW: Manager consulted    Damaris Jaimes MD  Internal Medicine Resident, PGY-1  [address] [place], [place]  3/27/2023, 11:36 AM
to be continued  Acute CHF:On IV lasix 20 mg BID, maintan intake/output record, keep negative fluid balance, follow up on Echo, Last echo 7/10/2022 showed LVH with impaired LV systolic function with an LVEF of 50%, global hypokinesia with severe calcific aortic valve causing severe aortic stenosis with mild MR, mild AR. Aortic stenosis: Patient have ejection systolic murmur, likely aortic stenosis, follow up on echo, recommendations based on echo results. Echo completed but not read. DVT ppx : On heparin drip      PT/OT: Consulted  Discharge Planning / SW: Manager consulted    Deedee Mathew MD  Internal Medicine Resident, PGY-1  [address] [place], [place]  3/28/2023, 2:49 PM

## 2023-04-17 ENCOUNTER — HOSPITAL ENCOUNTER (OUTPATIENT)
Dept: PHARMACY | Age: 68
Setting detail: THERAPIES SERIES
Discharge: HOME | End: 2023-04-17
Payer: MEDICAID

## 2023-04-17 DIAGNOSIS — Z95.2 S/P AVR: Primary | ICD-10-CM

## 2023-04-17 LAB
INR BLD: 2.4
PROTIME: 29.2 SECONDS

## 2023-04-17 PROCEDURE — 85610 PROTHROMBIN TIME: CPT

## 2023-04-17 PROCEDURE — 99211 OFF/OP EST MAY X REQ PHY/QHP: CPT

## 2023-04-17 NOTE — PROGRESS NOTES
Medication Management Service, Warfarin Management  LICO SALDIVAR [facility], [phone]  Visit Date: 4/17/2023   Subjective:   Timothy Edwards is a 80 y.o. female who presents to clinic today for anticoagulation monitoring and adjustment. Patient seen in clinic for warfarin management due to  Indication:   bio prosthetic valve replacement. INR goal: of 2.0-3.0. Duration of therapy: 3 months, Until July 4, 2023    Assessment and PLAN   PT/INR done in office per protocol. INR today is 2.4, therapeutic. Patient has taken 15 mg of warfarin over the past seven days. Plan: Will continue current regimen of warfarin 1.25 mg Mon, Thurs; 2.5 mg all other days of the week. This is a total of 15 mg of warfarin. Using warfarin 2.5 mg tablets. Called pharmacy and requested med refill. Recheck INR in 2 week(s). Patient seen in room # 2. Visit conducted using a video . Of Note:  Patient's daughter voiced concern about getting the rest of Va's medications filled. [place] and does not have any insurance. I discussed the situation with the pharmacist from [facility]. Patient has already been given a 14 day supply of her medications free of charge. They will not be able to provide another. Cost for 30 days of warfarin was reduced to $10 for the patient. I advised the daughter to look at [facility] and compare the prices there and then get her prescriptions transferred to the pharmacy where she can get the best prices. I pulled the [facility] up for her on the computer to show her how it works. I emphasized importance of working on this right away and not waiting for the patient to be completely out of medications. Patient verbalized understanding of dosing directions and information discussed. Dosing schedule given to patient. Progress note sent to referring office.   Patient acknowledges working in consult agreement with pharmacist as referred by

## 2023-05-03 ENCOUNTER — OFFICE VISIT (OUTPATIENT)
Dept: CARDIOTHORACIC SURGERY | Age: 68
End: 2023-05-03

## 2023-05-03 ENCOUNTER — HOSPITAL ENCOUNTER (OUTPATIENT)
Dept: PHARMACY | Age: 68
Setting detail: THERAPIES SERIES
Discharge: HOME | End: 2023-05-03
Payer: MEDICAID

## 2023-05-03 VITALS
DIASTOLIC BLOOD PRESSURE: 88 MMHG | SYSTOLIC BLOOD PRESSURE: 187 MMHG | HEIGHT: 63 IN | TEMPERATURE: 98.4 F | BODY MASS INDEX: 25.87 KG/M2 | HEART RATE: 65 BPM | OXYGEN SATURATION: 99 % | RESPIRATION RATE: 17 BRPM | WEIGHT: 146 LBS

## 2023-05-03 DIAGNOSIS — Z95.2 S/P AVR: Primary | ICD-10-CM

## 2023-05-03 DIAGNOSIS — I10 ESSENTIAL HYPERTENSION: Primary | ICD-10-CM

## 2023-05-03 LAB
INR BLD: 1.9
PROTIME: 23.1 SECONDS

## 2023-05-03 PROCEDURE — 85610 PROTHROMBIN TIME: CPT

## 2023-05-03 PROCEDURE — 99212 OFFICE O/P EST SF 10 MIN: CPT

## 2023-05-03 PROCEDURE — 99024 POSTOP FOLLOW-UP VISIT: CPT | Performed by: NURSE PRACTITIONER

## 2023-05-03 RX ORDER — HYDRALAZINE HYDROCHLORIDE 10 MG/1
10 TABLET, FILM COATED ORAL 2 TIMES DAILY
Qty: 90 TABLET | Refills: 3 | Status: SHIPPED | OUTPATIENT
Start: 2023-05-03 | End: 2023-05-03 | Stop reason: ALTCHOICE

## 2023-05-03 NOTE — PROGRESS NOTES
Medication Management Service, Warfarin Management  LICO SALDIVAR [facility] [phone]  Visit Date: 5/3/2023   Subjective:   Mando Valle is a 80 y.o. female who presents to clinic today for anticoagulation monitoring and adjustment. Patient seen in clinic for warfarin management due to  Indication:   bio prosthetic valve replacement. INR goal: of 2.0-3.0. Duration of therapy: 3 months, Until 2023    Assessment and PLAN   PT/INR done in office per protocol. INR today is 1.9, just below goal. Patient reported taking more warfarin than advised this week. She took 2.5 mg Monday not 1.25 mg.     Plan: Will increase current regimen of warfarin by 8.3% weekly to warfarin 1.25 mg ; 2.5 mg all other days of the week. Using warfarin 2.5 mg tablets. Recheck INR in 2 week(s). Patient seen in room # 2. Visit conducted using a video . Patient acknowledges working in consult agreement with pharmacist as referred by his/her physician. 5/3/2023     Patient verbalized understanding of dosing directions and information discussed. Dosing schedule given to patient. Progress note sent to referring office. [address] [place] [address]  Ph., CACP, Clinical Pharmacist  Anticoagulation Services, [address] [facility]  5/3/2023  9:56 AM    ====================================================================    For Pharmacy Admin Tracking Only    Intervention Detail: Adherence Monitorin and Dose Adjustment: 1, reason: Therapy Optimization  Total # of Interventions Recommended: 2  Total # of Interventions Accepted: 2  Time Spent (min): 30

## 2023-05-03 NOTE — PROGRESS NOTES
[facility]  Office Visit      Subjective:  Ms. Francesco Mackenzie is a 80 y.o. female s/p cardiothoracic surgery due to AVR. Patient states she has not been taking some of her blood pressure medication because she was confused on how to get it refilled. She has no chest pain shortness of breath nausea vomiting diarrhea fever chills. Patient is recovering at home with no issues. Physical Exam  Vitals:  Vitals:    05/03/23 1111   BP: (!) 187/88   Pulse:    Resp:    Temp:    SpO2:        General: Alert and Oriented x3. Ambulatory. No apparent distress. Chest:  No abnormality. Equal and symmetric expansion with respiration. Lungs:  Clear to auscultation. Cardiac:  Regular rate and rhythm without murmurs, rubs or gallops. Abdomen:  Soft, non-tender, normoactive bowel sounds. Extremities:  No edema. Intact pulses in all four extremities. Psychiatric: Mood and affect are appropriate.   Sternal incision is clean dry intact with no discharge or bleeding [place] clean dry intact with no discharge or bleeding    Current Medications:    Current Outpatient Medications:     atorvastatin (LIPITOR) 20 MG tablet, Take 1 tablet by mouth nightly, Disp: 30 tablet, Rfl: 3    losartan (COZAAR) 25 MG tablet, Take 1 tablet by mouth daily, Disp: 30 tablet, Rfl: 3    metoprolol tartrate (LOPRESSOR) 25 MG tablet, Take 1 tablet by mouth 2 times daily, Disp: 60 tablet, Rfl: 0    polyethylene glycol (GLYCOLAX) 17 g packet, Take 17 g by mouth daily, Disp: 30 each, Rfl: 0    pantoprazole (PROTONIX) 40 MG tablet, Take 1 tablet by mouth daily, Disp: 30 tablet, Rfl: 0    warfarin (COUMADIN) 2.5 MG tablet, Take 1 tablet by mouth daily Or unless otherwise instructed by coumadin clinic for INR goal of 2.0 to 3.0, Disp: 30 tablet, Rfl: 3    furosemide (LASIX) 40 MG tablet, Take 0.5 tablets by mouth daily, Disp: , Rfl:     spironolactone (ALDACTONE) 25 MG tablet, Take 0.5 tablets by mouth daily, Disp: , Rfl:     aspirin 81

## 2023-05-22 ENCOUNTER — HOSPITAL ENCOUNTER (OUTPATIENT)
Dept: PHARMACY | Age: 68
Setting detail: THERAPIES SERIES
Discharge: HOME | End: 2023-05-22
Payer: MEDICAID

## 2023-05-22 DIAGNOSIS — Z95.2 S/P AVR: Primary | ICD-10-CM

## 2023-05-22 LAB
INR BLD: 1.3
PROTIME: 16 SECONDS

## 2023-05-22 PROCEDURE — 99212 OFFICE O/P EST SF 10 MIN: CPT

## 2023-05-22 PROCEDURE — 85610 PROTHROMBIN TIME: CPT

## 2023-05-22 NOTE — PROGRESS NOTES
Medication Management Service, Warfarin Management  LICO SALDIVAR [facility], [phone]  Visit Date: 2023   Subjective:   Luz Maria Ramirez is a 80 y.o. female who presents to clinic today for anticoagulation monitoring and adjustment. Patient seen in clinic for warfarin management due to  Indication:   bio prosthetic valve replacement. INR goal: of 2.0-3.0. Duration of therapy: 3 months, Until 2023    Assessment and PLAN   PT/INR done in office per protocol. INR today is 1.3,subtherapetuic. Significant drop in INR possibly due to completion of amiodarone therapy on . No other cause identified for drop in INR today. Plan: Increase warfarin to 3.75 mg today only. Then increase current regimen of warfarin by 15.4% weekly to warfarin 3.75 mg ; 2.5 mg all other days of the week. This is a 23 % total increase in dose for this week. Consider adding another day of 3.75 mg to the maintenance plan at next INR check. Using warfarin 2.5 mg tablets. Recheck INR in 1 week(s). Patient seen in room # 2. Ed OP:  Discussed her vegetable intake in great detail. She usually has a serving of green vegetables each day. Visit conducted using a video . Patient verbalized understanding of dosing directions and information discussed. Dosing schedule given to patient. Progress note sent to referring office. [address] [place] [address]  Ph., CACP, Clinical Pharmacist  Anticoagulation Services, [address] [place] [address] Coumadin Clinic  2023  11:54 AM    ====================================================================    For Pharmacy Admin Tracking Only    Intervention Detail: Adherence Monitorin and Dose Adjustment: 1, reason: Therapy Optimization  Total # of Interventions Recommended: 2  Total # of Interventions Accepted: 2  Time Spent (min): 45

## 2023-06-01 ENCOUNTER — HOSPITAL ENCOUNTER (OUTPATIENT)
Dept: PHARMACY | Age: 68
Setting detail: THERAPIES SERIES
Discharge: HOME | End: 2023-06-01
Payer: MEDICAID

## 2023-06-01 ENCOUNTER — TELEPHONE (OUTPATIENT)
Dept: PHARMACY | Age: 68
End: 2023-06-01

## 2023-06-01 DIAGNOSIS — Z95.2 S/P AVR: Primary | ICD-10-CM

## 2023-06-01 LAB
INR BLD: 1.2
PROTIME: 14.4 SECONDS

## 2023-06-01 PROCEDURE — 99212 OFFICE O/P EST SF 10 MIN: CPT

## 2023-06-01 PROCEDURE — 99213 OFFICE O/P EST LOW 20 MIN: CPT

## 2023-06-01 PROCEDURE — 85610 PROTHROMBIN TIME: CPT

## 2023-06-01 RX ORDER — WARFARIN SODIUM 2.5 MG/1
TABLET ORAL
Qty: 40 TABLET | Refills: 0 | Status: SHIPPED | OUTPATIENT
Start: 2023-06-01

## 2023-06-01 NOTE — TELEPHONE ENCOUNTER
Due to conflicting information during visit today, I called pharmacy to get a refill history on patient warfarin 2.5 mg tablets.  #14 tablets   # 30 tablets, then transferred to [place]. Then called [facility] and spoke to [facility] Michelle   Warfarin filled for only 14 tablets in 2023 but was never picked up. Then called the daughter and left a  asking for a call back. I can call a new RX in to the pharmacy of their choice. [address] [place] [address]  Ph., CACP, Clinical Pharmacist  Anticoagulation Services, [address] [place] [address] Coumadin Clinic  2023  1:02 PM      For Pharmacy Admin Tracking Only    Intervention Detail: Adherence Monitorin  Total # of Interventions Recommended: 1  Total # of Interventions Accepted: 0  Time Spent (min): 15

## 2023-06-01 NOTE — TELEPHONE ENCOUNTER
Patient daughter called back. She got her last fill at [facility].  I sent a new warfarin RX per her request to [facility] in [facility]. [address] [place] [address]  Ph., STEFANIE, Clinical Pharmacist  [facility]tico[facility], [facility]  6/1/2023  1:58 PM

## 2023-06-01 NOTE — PROGRESS NOTES
Medication Management Service, Warfarin Management  [facility], [phone]  Visit Date: 2023   Subjective:   Ning Barron is a 80 y.o. female who presents to clinic today for anticoagulation monitoring and adjustment. Patient seen in clinic for warfarin management due to  Indication:   bio prosthetic valve replacement. INR goal: of 2.0-3.0. Duration of therapy: 3 months, Until 2023    Assessment and PLAN   PT/INR done in office per protocol. INR today is 1.2, subtherapeutic. Patient and daughter deny missing doses but  reports that mother ran low on tablets and was taking one daily for the past week. Patient wants to fly to [place] on . I cautioned her that her clotting risk is increased with flying and she is currently not protected from heart attack, stroke or even death. I would not fly. Plan: Increase warfarin regimen by 20% weekly to 2.5 mg  Mon, Wed, Fri, and 3.75 mg all other days of the week. Using warfarin 2.5 mg tablets. Recheck INR in 6 days  Patient seen in room # 2. Ed OP:  Stressed importance of taking every warfarin dose as instructed on her calendar. Visit conducted using a video . Mother and daughter are not in agreement on what dose she took this week. Patient verbalized understanding of dosing directions and information discussed. Dosing schedule given to patient. Progress note sent to referring office. After visit new warfarin script sent to [facility] pharmacy for the patient. [address] [place] [address]  Ph., CACP, Clinical Pharmacist  [facility], [address] [place] [address] [facility]  2023  12:30 PM    ====================================================================    For Pharmacy Admin Tracking Only    Intervention Detail: Adherence Monitorin, Dose Adjustment: 1, reason: Therapy Optimization, and Refill(s) Provided  Total # of Interventions Recommended: 3  Total # of Interventions Accepted: 3  Time

## 2023-06-07 ENCOUNTER — APPOINTMENT (OUTPATIENT)
Dept: PHARMACY | Age: 68
End: 2023-06-07
Payer: MEDICAID

## 2023-06-08 ENCOUNTER — HOSPITAL ENCOUNTER (OUTPATIENT)
Dept: PHARMACY | Age: 68
Setting detail: THERAPIES SERIES
Discharge: HOME | End: 2023-06-08
Payer: MEDICAID

## 2023-06-08 ENCOUNTER — OFFICE VISIT (OUTPATIENT)
Dept: CARDIOTHORACIC SURGERY | Age: 68
End: 2023-06-08

## 2023-06-08 VITALS
OXYGEN SATURATION: 98 % | TEMPERATURE: 97.8 F | BODY MASS INDEX: 25.44 KG/M2 | HEART RATE: 76 BPM | SYSTOLIC BLOOD PRESSURE: 132 MMHG | RESPIRATION RATE: 16 BRPM | HEIGHT: 64 IN | WEIGHT: 149 LBS | DIASTOLIC BLOOD PRESSURE: 78 MMHG

## 2023-06-08 DIAGNOSIS — G89.18 POST-OP PAIN: Primary | ICD-10-CM

## 2023-06-08 DIAGNOSIS — Z95.2 S/P AVR: Primary | ICD-10-CM

## 2023-06-08 LAB
INR BLD: 1.6
PROTIME: 19.4 SECONDS

## 2023-06-08 PROCEDURE — 85610 PROTHROMBIN TIME: CPT

## 2023-06-08 PROCEDURE — 99212 OFFICE O/P EST SF 10 MIN: CPT

## 2023-06-08 PROCEDURE — 99024 POSTOP FOLLOW-UP VISIT: CPT | Performed by: NURSE PRACTITIONER

## 2023-06-08 NOTE — PROGRESS NOTES
[facility]  Office Visit      Subjective:  Ms. Logan Mejia is a 80 y.o. female s/p presented back to cardiothoracic surgery because she states there is some pain that comes and goes randomly where pacer wires were clipped. She denies any bleeding. No nausea vomiting diarrhea fever chills. She is taking her Coumadin and is subtherapeutic at this time working on become therapeutic. Physical Exam  Vitals:  Vitals:    06/08/23 0959   BP: 132/78   Pulse: 76   Resp: 16   Temp: 97.8 °F (36.6 °C)   SpO2: 98%       General: Alert and Oriented x3. Ambulatory. No apparent distress. Chest:  No abnormality. Equal and symmetric expansion with respiration. Lungs:  Clear to auscultation. Cardiac:  Regular rate and rhythm without murmurs, rubs or gallops. Abdomen:  Soft, non-tender, normoactive bowel sounds. Extremities:  No edema. Intact pulses in all four extremities. Psychiatric: Mood and affect are appropriate. I was unable to palpate a pacer wire. I was able to mimic the pain that is likely caused from the pacer wire. I can feel the pacer wire but at this time I do not feel safe extracting the wire due to patient being on Coumadin. Current Medications:    Current Outpatient Medications:     warfarin (COUMADIN) 2.5 MG tablet, Take 1 to 1 & 1/2 tablets by mouth once daily as directed by Medication Management.  40 tablets = 30 DS, Disp: 40 tablet, Rfl: 0    atorvastatin (LIPITOR) 20 MG tablet, Take 1 tablet by mouth nightly, Disp: 90 tablet, Rfl: 3    furosemide (LASIX) 40 MG tablet, Take 0.5 tablets by mouth daily, Disp: 90 tablet, Rfl: 3    losartan (COZAAR) 25 MG tablet, Take 1 tablet by mouth daily, Disp: 90 tablet, Rfl: 3    metoprolol tartrate (LOPRESSOR) 25 MG tablet, Take 1 tablet by mouth 2 times daily, Disp: 180 tablet, Rfl: 3    spironolactone (ALDACTONE) 25 MG tablet, Take 0.5 tablets by mouth daily, Disp: 90 tablet, Rfl: 3    pantoprazole (PROTONIX) 40 MG tablet, Take 1 tablet

## (undated) DEVICE — GLOVE SURG SZ 75 CRM LTX FREE POLYISOPRENE POLYMER BEAD ANTI

## (undated) DEVICE — DRAIN,WOUND,ROUND,24FR,5/16",FULL-FLUTED: Brand: MEDLINE

## (undated) DEVICE — 48" PROBE COVER W/GEL, ULTRASOUND, STERILE: Brand: SITE-RITE

## (undated) DEVICE — AGENT HEMSTAT W2XL4IN OXIDIZED REGENERATED CELOS ABSRB SFT

## (undated) DEVICE — PACK PROCEDURE SURG OPN HRT

## (undated) DEVICE — TUBING SUCT 12FR MAL ALUM SHFT FN CAP VENT UNIV CONN W/ OBT

## (undated) DEVICE — TUBING, SUCTION, 9/32" X 20', STRAIGHT: Brand: MEDLINE INDUSTRIES, INC.

## (undated) DEVICE — ADHESIVE SKIN CLOSURE TOP 36 CC HI VISC DERMBND MINI

## (undated) DEVICE — BLADE ES L6IN ELASTOMERIC COAT EXT DURABLE BEND UPTO 90DEG

## (undated) DEVICE — RETRACTOR SURG INSRT SUT HLD OCTOBASE

## (undated) DEVICE — DRAIN SURG W10XL20CM SIL SMOOTH FLAT 3/4 PERF DBL WRP

## (undated) DEVICE — GOWN,SIRUS,POLYRNF,XLN/3XL,18/CS: Brand: MEDLINE

## (undated) DEVICE — 3M™ IOBAN™ 2 ANTIMICROBIAL INCISE DRAPE 6651EZ: Brand: IOBAN™ 2

## (undated) DEVICE — CANNULA PERF L2IN BLNT TIP 2MM VES CLR RADPQ BODY FEM LUER

## (undated) DEVICE — GLOVE SURG SZ 7 CRM LTX FREE POLYISOPRENE POLYMER BEAD ANTI

## (undated) DEVICE — APPLICATOR MEDICATED 26 CC SOLUTION HI LT ORNG CHLORAPREP

## (undated) DEVICE — DRAPE SLUSH DISC W44XL66IN ST FOR RND BSIN HUSH SLUSH SYS

## (undated) DEVICE — PLATELET CONCENTRATION PACK PROC 14-20 ML SMARTPREP 2

## (undated) DEVICE — PROTECTOR ULN NRV PUR FOAM HK LOOP STRP ANATOMICALLY

## (undated) DEVICE — FOGARTY - HYDRAGRIP SURGICAL - CLAMP INSERTS: Brand: FOGARTY HYDRAJAW

## (undated) DEVICE — TOWEL,OR,DSP,ST,BLUE,DLX,XR,4/PK,20PK/CS: Brand: MEDLINE

## (undated) DEVICE — CANNULA PERFUSION 5.5IN 9FR AORTIC ROOT

## (undated) DEVICE — SUTURE VCRL + SZ 4-0 L18IN ABSRB UD L19MM PS-2 3/8 CIR PRIM VCP496H

## (undated) DEVICE — PLEDGET SURG W3.5XL7MM THK1.5MM WHT PTFE RECT FIRM TFE

## (undated) DEVICE — GLOVE SURG SZ 7.5 L11.73IN FNGR THK9.8MIL STRW LTX POLYMER

## (undated) DEVICE — CONNECTOR TBNG Y 6IN 1 PLAS LTWT

## (undated) DEVICE — SUTURE ETHIB EXCL BR GRN TAPR PT 2-0 30 X563H X563H

## (undated) DEVICE — GLOVE SURG SZ 65 CRM LTX FREE POLYISOPRENE POLYMER BEAD ANTI

## (undated) DEVICE — INTENDED FOR TISSUE SEPARATION, AND OTHER PROCEDURES THAT REQUIRE A SHARP SURGICAL BLADE TO PUNCTURE OR CUT.: Brand: BARD-PARKER ® CARBON RIB-BACK BLADES

## (undated) DEVICE — CATHETER THOR 36FR L23CM DIA12MM POLYVI CHL TAPR CONN TIP

## (undated) DEVICE — SUTURE PERMA-HAND SZ 0 L18IN NONABSORBABLE BLK CT-2 L26MM C027D

## (undated) DEVICE — SUTURE VCRL + SZ 2-0 L27IN ABSRB CLR CT-1 1/2 CIR TAPERCUT VCP259H

## (undated) DEVICE — CANNULA PERF RT ANGLE 5 MM LT CORONARY ART

## (undated) DEVICE — SUTURE PROL SZ 4-0 L36IN NONABSORBABLE BLU L26MM SH 1/2 CIR 8521H

## (undated) DEVICE — RESERVOIR,SUCTION,100CC,SILICONE: Brand: MEDLINE

## (undated) DEVICE — SUTURE NONABSORBABLE MONOFILAMENT 4-0 RB-1 36 IN BLU PROLENE 8557H

## (undated) DEVICE — APPLICATOR MEDICATED 10.5 CC SOLUTION HI LT ORNG CHLORAPREP

## (undated) DEVICE — GLOVE SURG SZ 7 L12IN FNGR THK79MIL GRN LTX FREE

## (undated) DEVICE — PREMIUM DRY TRAY LF: Brand: MEDLINE INDUSTRIES, INC.

## (undated) DEVICE — GOWN,SURGICAL,AURORA,SLEEVE: Brand: MEDLINE

## (undated) DEVICE — TTL1LYR 16FR10ML 100%SIL TMPST TR: Brand: MEDLINE

## (undated) DEVICE — Device: Brand: ZDRIVE™

## (undated) DEVICE — GOWN,AURORA,NONREINFORCED,LARGE: Brand: MEDLINE

## (undated) DEVICE — PENCIL ES L3M BTTN SWCH HOLSTER W/ BLDE ELECTRD EDGE

## (undated) DEVICE — GLOVE SURG SZ 75 L12IN FNGR THK79MIL GRN LTX FREE

## (undated) DEVICE — CATHETER,URETHRAL,REDRUBBER,STRL,18FR: Brand: MEDLINE

## (undated) DEVICE — SYRINGE IRRIG 60ML SFT PLIABLE BLB EZ TO GRP 1 HND USE W/

## (undated) DEVICE — SS SUTURE, 3 PER SLEEVE: Brand: MYO/WIRE II

## (undated) DEVICE — BRA COMPR LG NYL LYCRA SPANDEX WHT CURAD

## (undated) DEVICE — SUTURE PDS II SZ 0 L27IN ABSRB VLT L36MM CT-1 1/2 CIR Z340H

## (undated) DEVICE — WRAP,STERILIZATION,CSR,GEMINI,24X24,L WT: Brand: MEDLINE

## (undated) DEVICE — WIRE PACING BIPOLAR VENTRICULAR 60CM ULTRA THIN

## (undated) DEVICE — WAX SURG 2.5GM HEMSTAT BNE BEESWAX PARAFFIN ISO PALMITATE

## (undated) DEVICE — Z DISCONTINUED NO SUB IDED DRAIN SURG 2 COLL PT TB FOR ATS BG OASIS

## (undated) DEVICE — COUNTER NDL 40 COUNT HLD 70 FOAM BLK ADH W/ MAG

## (undated) DEVICE — BLADE SURG NO15 S STL STR DISP GLASSVAN

## (undated) DEVICE — GLOVE SURG SZ 65 L12IN FNGR THK79MIL GRN LTX FREE

## (undated) DEVICE — GOWN,SIRUS,NONRNF,SETINSLV,2XL,18/CS: Brand: MEDLINE

## (undated) DEVICE — COVER,LIGHT HANDLE,FLX,2/PK: Brand: MEDLINE INDUSTRIES, INC.

## (undated) DEVICE — COR-KNOT MINI® COMBO KITBASE PACKAGE TYPE - KITEACH STERILE PACKAGE KIT CONTAINS (2) SINGLE PATIENT USE COR-KNOT MINI® DEVICES AND (12) COR-KNOT® QUICK LOADS®.: Brand: COR-KNOT MINI®